# Patient Record
Sex: FEMALE | Race: WHITE | Employment: OTHER | URBAN - METROPOLITAN AREA
[De-identification: names, ages, dates, MRNs, and addresses within clinical notes are randomized per-mention and may not be internally consistent; named-entity substitution may affect disease eponyms.]

---

## 2017-02-07 ENCOUNTER — HOSPITAL ENCOUNTER (EMERGENCY)
Age: 82
Discharge: HOME | End: 2017-02-07
Payer: MEDICARE

## 2017-02-07 VITALS
HEIGHT: 65 IN | OXYGEN SATURATION: 92 % | TEMPERATURE: 98.7 F | SYSTOLIC BLOOD PRESSURE: 121 MMHG | HEART RATE: 77 BPM | WEIGHT: 160 LBS | RESPIRATION RATE: 20 BRPM | DIASTOLIC BLOOD PRESSURE: 55 MMHG | BODY MASS INDEX: 26.66 KG/M2

## 2017-02-07 DIAGNOSIS — G89.29 CHRONIC RIGHT-SIDED LOW BACK PAIN, WITH SCIATICA PRESENCE UNSPECIFIED: Primary | ICD-10-CM

## 2017-02-07 DIAGNOSIS — M54.5 CHRONIC RIGHT-SIDED LOW BACK PAIN, WITH SCIATICA PRESENCE UNSPECIFIED: Primary | ICD-10-CM

## 2017-02-07 PROCEDURE — 99283 EMERGENCY DEPT VISIT LOW MDM: CPT

## 2017-02-07 RX ORDER — TRAMADOL HYDROCHLORIDE 50 MG/1
50 TABLET ORAL
Qty: 15 TAB | Refills: 0 | Status: ON HOLD | OUTPATIENT
Start: 2017-02-07 | End: 2017-02-20 | Stop reason: SDUPTHER

## 2017-02-07 NOTE — ED PROVIDER NOTES
HPI Comments: 80-year-old female complaining of low back pain. Patient states she has had back problems for a long time and recently fell but it did not relate the back pain to that. Patient is a 80 y.o. female presenting with back pain. The history is provided by the patient. Back Pain    This is a chronic problem. The current episode started more than 1 week ago. The problem has not changed since onset. The problem occurs constantly. Patient reports not work related injury. The pain is associated with a fall and a remote injury. The pain is present in the lumbar spine. The pain is at a severity of 8/10. The pain is moderate. Pertinent negatives include no abdominal pain, no abdominal swelling, no bowel incontinence, no perianal numbness, no paresthesias, no paresis, no tingling and no weakness. She has tried nothing for the symptoms. Risk factors include a history of osteoporosis, lack of exercise and menopause. The patient's surgical history non-contributory        Past Medical History:   Diagnosis Date    Arthritis     Headache(784.0)     Hypertension     Ill-defined condition      chronic back pain    Ill-defined condition      neuropathy    Psychiatric disorder      anxiety    Thyroid disease        Past Surgical History:   Procedure Laterality Date    Pr cardiac surg procedure unlist       stent    Hx cholecystectomy       hernia repair    Hx orthopaedic       bilateral knee replacements         History reviewed. No pertinent family history. Social History     Social History    Marital status:      Spouse name: N/A    Number of children: N/A    Years of education: N/A     Occupational History    Not on file.      Social History Main Topics    Smoking status: Never Smoker    Smokeless tobacco: Never Used    Alcohol use No    Drug use: Not on file    Sexual activity: Not on file     Other Topics Concern    Not on file     Social History Narrative         ALLERGIES: Review of patient's allergies indicates no known allergies. Review of Systems   Constitutional: Negative. Negative for activity change. HENT: Negative. Eyes: Negative. Respiratory: Negative. Cardiovascular: Negative. Gastrointestinal: Negative. Negative for abdominal pain and bowel incontinence. Genitourinary: Negative. Musculoskeletal: Positive for back pain. Skin: Negative. Neurological: Negative. Negative for tingling, weakness and paresthesias. Psychiatric/Behavioral: Negative. All other systems reviewed and are negative. Vitals:    02/07/17 0420 02/07/17 0645   BP: 176/75    Pulse: 92 85   Resp: 18    Temp: 98.3 °F (36.8 °C)    SpO2: 98% 95%   Weight: 72.6 kg (160 lb)    Height: 5' 5\" (1.651 m)             Physical Exam   Constitutional: She is oriented to person, place, and time. She appears well-developed and well-nourished. No distress. HENT:   Head: Normocephalic and atraumatic. Right Ear: External ear normal.   Left Ear: External ear normal.   Nose: Nose normal.   Mouth/Throat: Oropharynx is clear and moist. No oropharyngeal exudate. Eyes: Conjunctivae and EOM are normal. Pupils are equal, round, and reactive to light. Right eye exhibits no discharge. Left eye exhibits no discharge. No scleral icterus. Neck: Normal range of motion. Neck supple. No JVD present. No tracheal deviation present. Cardiovascular: Normal rate, regular rhythm and intact distal pulses. Pulmonary/Chest: Effort normal and breath sounds normal. No stridor. No respiratory distress. She has no wheezes. She exhibits no tenderness. Abdominal: Soft. Bowel sounds are normal. She exhibits no distension and no mass. There is no tenderness. Musculoskeletal: Normal range of motion. She exhibits no edema or tenderness. Lumbar back: She exhibits pain. Back:    Neurological: She is alert and oriented to person, place, and time. No cranial nerve deficit. Skin: Skin is warm and dry.  No rash noted. She is not diaphoretic. No erythema. No pallor. Psychiatric: She has a normal mood and affect. Her behavior is normal. Thought content normal.   Nursing note and vitals reviewed. MDM  Number of Diagnoses or Management Options  Diagnosis management comments: Exacerbation of chronic back pain treated with pain medicines on short-term follow-up primary care physician.     ED Course       Procedures

## 2017-02-07 NOTE — ED NOTES
Patient's daughter states needs to call neighbor for ride home. Called number given- [phone]. No one answered. Voicemail left.

## 2017-02-07 NOTE — DISCHARGE INSTRUCTIONS
Learning About Relief for Back Pain  What is back tension and strain? Back strain happens when you overstretch, or pull, a muscle in your back. You may hurt your back in an accident or when you exercise or lift something. Most back pain will get better with rest and time. You can take care of yourself at home to help your back heal.  What can you do first to relieve back pain? When you first feel back pain, try these steps:  · Walk. Take a short walk (10 to 20 minutes) on a level surface (no slopes, hills, or stairs) every 2 to 3 hours. Walk only distances you can manage without pain, especially leg pain. · Relax. Find a comfortable position for rest. Some people are comfortable on the floor or a medium-firm bed with a small pillow under their head and another under their knees. Some people prefer to lie on their side with a pillow between their knees. Don't stay in one position for too long. · Try heat or ice. Try using a heating pad on a low or medium setting, or take a warm shower, for 15 to 20 minutes every 2 to 3 hours. Or you can buy single-use heat wraps that last up to 8 hours. You can also try an ice pack for 10 to 15 minutes every 2 to 3 hours. You can use an ice pack or a bag of frozen vegetables wrapped in a thin towel. There is not strong evidence that either heat or ice will help, but you can try them to see if they help. You may also want to try switching between heat and cold. · Take pain medicine exactly as directed. ¨ If the doctor gave you a prescription medicine for pain, take it as prescribed. ¨ If you are not taking a prescription pain medicine, ask your doctor if you can take an over-the-counter medicine. What else can you do? · Stretch and exercise. Exercises that increase flexibility may relieve your pain and make it easier for your muscles to keep your spine in a good, neutral position. And don't forget to keep walking. · Do self-massage.  You can use self-massage to unwind after work or school or to energize yourself in the morning. You can easily massage your feet, hands, or neck. Self-massage works best if you are in comfortable clothes and are sitting or lying in a comfortable position. Use oil or lotion to massage bare skin. · Reduce stress. Back pain can lead to a vicious [place]: Distress about the pain tenses the muscles in your back, which in turn causes more pain. Learn how to relax your mind and your muscles to lower your stress. Where can you learn more? Go to http://edilia-harjinder.info/. Enter C831 in the search box to learn more about \"Learning About Relief for Back Pain. \"  Current as of: May 23, 2016  Content Version: 11.1  © 5121-1509 [employer]. Care instructions adapted under license by [employer] (which disclaims liability or warranty for this information). If you have questions about a medical condition or this instruction, always ask your healthcare professional. [place] [address] any warranty or liability for your use of this information.

## 2017-02-07 NOTE — ED NOTES
I have reviewed discharge instructions with the patient. The patient verbalized understanding. No questions when given opportunity to ask. Prescription x1 given. Patient discharged via [facility] to waiting room to await ride home.

## 2017-02-07 NOTE — ED TRIAGE NOTES
Pt states that she fell back in January and has been having back pain. Per ems the house is that of [employer] and there is not anywhere to walk around and she falls a lot.

## 2017-02-19 ENCOUNTER — HOSPITAL ENCOUNTER (OUTPATIENT)
Age: 82
Setting detail: OBSERVATION
Discharge: NURSING, REHAB OR LONG-TERM CARE | End: 2017-02-21
Attending: EMERGENCY MEDICINE | Admitting: INTERNAL MEDICINE
Payer: MEDICARE

## 2017-02-19 ENCOUNTER — APPOINTMENT (OUTPATIENT)
Dept: GENERAL RADIOLOGY | Age: 82
End: 2017-02-19
Attending: EMERGENCY MEDICINE
Payer: MEDICARE

## 2017-02-19 ENCOUNTER — APPOINTMENT (OUTPATIENT)
Dept: CT IMAGING | Age: 82
End: 2017-02-19
Attending: INTERNAL MEDICINE
Payer: MEDICARE

## 2017-02-19 DIAGNOSIS — T79.6XXA TRAUMATIC RHABDOMYOLYSIS, INITIAL ENCOUNTER (HCC): ICD-10-CM

## 2017-02-19 DIAGNOSIS — R53.1 GENERALIZED WEAKNESS: Primary | ICD-10-CM

## 2017-02-19 PROBLEM — N30.00 ACUTE CYSTITIS WITHOUT HEMATURIA: Status: ACTIVE | Noted: 2017-02-19

## 2017-02-19 PROBLEM — I87.2 VENOUS STASIS DERMATITIS: Status: ACTIVE | Noted: 2017-02-19

## 2017-02-19 PROBLEM — M62.82 RHABDOMYOLYSIS: Status: ACTIVE | Noted: 2017-02-19

## 2017-02-19 PROBLEM — N18.30 CKD (CHRONIC KIDNEY DISEASE) STAGE 3, GFR 30-59 ML/MIN (HCC): Status: ACTIVE | Noted: 2017-02-19

## 2017-02-19 PROBLEM — G93.41 ACUTE METABOLIC ENCEPHALOPATHY: Status: ACTIVE | Noted: 2017-02-19

## 2017-02-19 LAB
ALBUMIN SERPL BCP-MCNC: 2.7 G/DL (ref 3.2–4.6)
ALBUMIN/GLOB SERPL: 0.5 {RATIO} (ref 1.2–3.5)
ALP SERPL-CCNC: 129 U/L (ref 50–136)
ALT SERPL-CCNC: 28 U/L (ref 12–65)
ANION GAP BLD CALC-SCNC: 9 MMOL/L (ref 7–16)
AST SERPL W P-5'-P-CCNC: 52 U/L (ref 15–37)
BACTERIA URNS QL MICRO: ABNORMAL /HPF
BASOPHILS # BLD AUTO: 0 K/UL (ref 0–0.2)
BASOPHILS # BLD: 0 % (ref 0–2)
BILIRUB SERPL-MCNC: 0.5 MG/DL (ref 0.2–1.1)
BUN SERPL-MCNC: 26 MG/DL (ref 8–23)
CALCIUM SERPL-MCNC: 10 MG/DL (ref 8.3–10.4)
CASTS URNS QL MICRO: ABNORMAL /LPF
CHLORIDE SERPL-SCNC: 106 MMOL/L (ref 98–107)
CK MB CFR SERPL CALC: 0.8 %
CK MB SERPL-MCNC: 5.1 NG/ML (ref 0.5–3.6)
CK SERPL-CCNC: 658 U/L (ref 21–215)
CO2 SERPL-SCNC: 25 MMOL/L (ref 21–32)
CREAT SERPL-MCNC: 1.2 MG/DL (ref 0.6–1)
DIFFERENTIAL METHOD BLD: ABNORMAL
EOSINOPHIL # BLD: 0 K/UL (ref 0–0.8)
EOSINOPHIL NFR BLD: 0 % (ref 0.5–7.8)
EPI CELLS #/AREA URNS HPF: ABNORMAL /HPF
ERYTHROCYTE [DISTWIDTH] IN BLOOD BY AUTOMATED COUNT: 13.1 % (ref 11.9–14.6)
GLOBULIN SER CALC-MCNC: 5.3 G/DL (ref 2.3–3.5)
GLUCOSE SERPL-MCNC: 99 MG/DL (ref 65–100)
HCT VFR BLD AUTO: 38.3 % (ref 35.8–46.3)
HGB BLD-MCNC: 12.4 G/DL (ref 11.7–15.4)
IMM GRANULOCYTES # BLD: 0 K/UL (ref 0–0.5)
IMM GRANULOCYTES NFR BLD AUTO: 0.4 % (ref 0–5)
LYMPHOCYTES # BLD AUTO: 20 % (ref 13–44)
LYMPHOCYTES # BLD: 1.9 K/UL (ref 0.5–4.6)
MCH RBC QN AUTO: 30.9 PG (ref 26.1–32.9)
MCHC RBC AUTO-ENTMCNC: 32.4 G/DL (ref 31.4–35)
MCV RBC AUTO: 95.5 FL (ref 79.6–97.8)
MONOCYTES # BLD: 0.6 K/UL (ref 0.1–1.3)
MONOCYTES NFR BLD AUTO: 7 % (ref 4–12)
MYOGLOBIN SERPL-MCNC: 1296 NG/ML (ref 9–82)
NEUTS SEG # BLD: 6.8 K/UL (ref 1.7–8.2)
NEUTS SEG NFR BLD AUTO: 73 % (ref 43–78)
PLATELET # BLD AUTO: 222 K/UL (ref 150–450)
PMV BLD AUTO: 10.5 FL (ref 10.8–14.1)
POTASSIUM SERPL-SCNC: 4.1 MMOL/L (ref 3.5–5.1)
PROT SERPL-MCNC: 8 G/DL (ref 6.3–8.2)
RBC # BLD AUTO: 4.01 M/UL (ref 4.05–5.25)
RBC #/AREA URNS HPF: ABNORMAL /HPF
SODIUM SERPL-SCNC: 140 MMOL/L (ref 136–145)
WBC # BLD AUTO: 9.4 K/UL (ref 4.3–11.1)
WBC URNS QL MICRO: >100 /HPF

## 2017-02-19 PROCEDURE — 51701 INSERT BLADDER CATHETER: CPT | Performed by: EMERGENCY MEDICINE

## 2017-02-19 PROCEDURE — 96361 HYDRATE IV INFUSION ADD-ON: CPT | Performed by: EMERGENCY MEDICINE

## 2017-02-19 PROCEDURE — 96360 HYDRATION IV INFUSION INIT: CPT | Performed by: EMERGENCY MEDICINE

## 2017-02-19 PROCEDURE — 74011250636 HC RX REV CODE- 250/636: Performed by: EMERGENCY MEDICINE

## 2017-02-19 PROCEDURE — 83874 ASSAY OF MYOGLOBIN: CPT | Performed by: EMERGENCY MEDICINE

## 2017-02-19 PROCEDURE — 74011000302 HC RX REV CODE- 302: Performed by: INTERNAL MEDICINE

## 2017-02-19 PROCEDURE — 81015 MICROSCOPIC EXAM OF URINE: CPT | Performed by: EMERGENCY MEDICINE

## 2017-02-19 PROCEDURE — 74011250637 HC RX REV CODE- 250/637: Performed by: INTERNAL MEDICINE

## 2017-02-19 PROCEDURE — 73080 X-RAY EXAM OF ELBOW: CPT

## 2017-02-19 PROCEDURE — 99218 HC RM OBSERVATION: CPT

## 2017-02-19 PROCEDURE — 96361 HYDRATE IV INFUSION ADD-ON: CPT

## 2017-02-19 PROCEDURE — 87040 BLOOD CULTURE FOR BACTERIA: CPT | Performed by: INTERNAL MEDICINE

## 2017-02-19 PROCEDURE — 82550 ASSAY OF CK (CPK): CPT | Performed by: EMERGENCY MEDICINE

## 2017-02-19 PROCEDURE — 87186 SC STD MICRODIL/AGAR DIL: CPT | Performed by: INTERNAL MEDICINE

## 2017-02-19 PROCEDURE — 74011250636 HC RX REV CODE- 250/636: Performed by: INTERNAL MEDICINE

## 2017-02-19 PROCEDURE — 77030011256 HC DRSG MEPILEX <16IN NO BORD MOLN -A

## 2017-02-19 PROCEDURE — 87086 URINE CULTURE/COLONY COUNT: CPT | Performed by: INTERNAL MEDICINE

## 2017-02-19 PROCEDURE — 70450 CT HEAD/BRAIN W/O DYE: CPT

## 2017-02-19 PROCEDURE — 77030011943

## 2017-02-19 PROCEDURE — 87088 URINE BACTERIA CULTURE: CPT | Performed by: INTERNAL MEDICINE

## 2017-02-19 PROCEDURE — 96372 THER/PROPH/DIAG INJ SC/IM: CPT

## 2017-02-19 PROCEDURE — 80053 COMPREHEN METABOLIC PANEL: CPT | Performed by: EMERGENCY MEDICINE

## 2017-02-19 PROCEDURE — 86580 TB INTRADERMAL TEST: CPT | Performed by: INTERNAL MEDICINE

## 2017-02-19 PROCEDURE — 36415 COLL VENOUS BLD VENIPUNCTURE: CPT | Performed by: INTERNAL MEDICINE

## 2017-02-19 PROCEDURE — 85025 COMPLETE CBC W/AUTO DIFF WBC: CPT | Performed by: EMERGENCY MEDICINE

## 2017-02-19 PROCEDURE — 99284 EMERGENCY DEPT VISIT MOD MDM: CPT | Performed by: EMERGENCY MEDICINE

## 2017-02-19 RX ORDER — ENOXAPARIN SODIUM 100 MG/ML
40 INJECTION SUBCUTANEOUS EVERY 24 HOURS
Status: DISCONTINUED | OUTPATIENT
Start: 2017-02-19 | End: 2017-02-21 | Stop reason: HOSPADM

## 2017-02-19 RX ORDER — SODIUM CHLORIDE 9 MG/ML
100 INJECTION, SOLUTION INTRAVENOUS CONTINUOUS
Status: DISCONTINUED | OUTPATIENT
Start: 2017-02-19 | End: 2017-02-21 | Stop reason: HOSPADM

## 2017-02-19 RX ORDER — SODIUM CHLORIDE 0.9 % (FLUSH) 0.9 %
5-10 SYRINGE (ML) INJECTION EVERY 8 HOURS
Status: DISCONTINUED | OUTPATIENT
Start: 2017-02-19 | End: 2017-02-21 | Stop reason: HOSPADM

## 2017-02-19 RX ORDER — TRAMADOL HYDROCHLORIDE 50 MG/1
50 TABLET ORAL
Status: DISCONTINUED | OUTPATIENT
Start: 2017-02-19 | End: 2017-02-21 | Stop reason: HOSPADM

## 2017-02-19 RX ORDER — LEVOTHYROXINE SODIUM 75 UG/1
75 TABLET ORAL
Status: DISCONTINUED | OUTPATIENT
Start: 2017-02-20 | End: 2017-02-21 | Stop reason: HOSPADM

## 2017-02-19 RX ORDER — SODIUM CHLORIDE 0.9 % (FLUSH) 0.9 %
5-10 SYRINGE (ML) INJECTION AS NEEDED
Status: DISCONTINUED | OUTPATIENT
Start: 2017-02-19 | End: 2017-02-21 | Stop reason: HOSPADM

## 2017-02-19 RX ORDER — GABAPENTIN 300 MG/1
600 CAPSULE ORAL 3 TIMES DAILY
Status: DISCONTINUED | OUTPATIENT
Start: 2017-02-19 | End: 2017-02-21 | Stop reason: HOSPADM

## 2017-02-19 RX ADMIN — ENOXAPARIN SODIUM 40 MG: 40 INJECTION SUBCUTANEOUS at 17:04

## 2017-02-19 RX ADMIN — SODIUM CHLORIDE 100 ML/HR: 900 INJECTION, SOLUTION INTRAVENOUS at 20:53

## 2017-02-19 RX ADMIN — Medication 5 ML: at 20:53

## 2017-02-19 RX ADMIN — GABAPENTIN 600 MG: 300 CAPSULE ORAL at 20:49

## 2017-02-19 RX ADMIN — SODIUM CHLORIDE 150 ML/HR: 900 INJECTION, SOLUTION INTRAVENOUS at 12:35

## 2017-02-19 RX ADMIN — GABAPENTIN 600 MG: 300 CAPSULE ORAL at 17:04

## 2017-02-19 RX ADMIN — TUBERCULIN PURIFIED PROTEIN DERIVATIVE 5 UNITS: 5 INJECTION, SOLUTION INTRADERMAL at 17:04

## 2017-02-19 RX ADMIN — Medication 10 ML: at 17:06

## 2017-02-19 NOTE — ED PROVIDER NOTES
HPI Comments: Patient arrives to the emergency room in via EMS from home after having been found lying on the floor in her living room this morning. Patient states that she was in her recliner and negative attempting to get up to go to bed at about midnight when her feet slid out from under her she could not stop from falling and landed facedown on the floor. She complains of pain to the elbow into the left side of the face where she sustained an abrasion she denies any loss of consciousness or head injury she does suffer from chronic back pain for which she takes Ultram and Neurontin and states this does not feel at particular worse at this time however due to generalized weakness she was unable to get herself up off the floor and remained there until she was found by her special needs daughter this morning. She denies any recent illnesses and other than the pain in the right elbow in the face has no other complaints    Patient is a 80 y.o. female presenting with fall. The history is provided by the patient. Fall   The accident occurred 6 to 12 hours ago. The fall occurred while standing. She fell from a height of ground level. She landed on hard floor. There was no blood loss. Point of impact: hands and elbows. The pain is present in the right elbow (Left cheek). The pain is at a severity of 8/10. The pain is severe. She was not ambulatory at the scene. There was no entrapment after the fall. There was no drug use involved in the accident. There was no alcohol use involved in the accident. Pertinent negatives include no visual change, no fever, no numbness, no bowel incontinence, no nausea, no vomiting, no hematuria, no headaches, no extremity weakness, no hearing loss, no loss of consciousness, no tingling and no laceration. The risk factors include being elderly. The symptoms are aggravated by activity. She has tried nothing for the symptoms. The treatment provided no relief.         Past Medical History: Diagnosis Date    Arthritis     Headache(784.0)     Hypertension     Ill-defined condition      chronic back pain    Ill-defined condition      neuropathy    Psychiatric disorder      anxiety    Thyroid disease        Past Surgical History:   Procedure Laterality Date    Pr cardiac surg procedure unlist       stent    Hx cholecystectomy       hernia repair    Hx orthopaedic       bilateral knee replacements         History reviewed. No pertinent family history. Social History     Social History    Marital status:      Spouse name: N/A    Number of children: N/A    Years of education: N/A     Occupational History    Not on file. Social History Main Topics    Smoking status: Never Smoker    Smokeless tobacco: Never Used    Alcohol use No    Drug use: Not on file    Sexual activity: Not on file     Other Topics Concern    Not on file     Social History Narrative         ALLERGIES: Review of patient's allergies indicates no known allergies. Review of Systems   Constitutional: Negative for chills and fever. Gastrointestinal: Negative for bowel incontinence, nausea and vomiting. Genitourinary: Negative for hematuria. Musculoskeletal: Negative for extremity weakness. Neurological: Negative for tingling, loss of consciousness, numbness and headaches. All other systems reviewed and are negative. Vitals:    02/19/17 1038 02/19/17 1043 02/19/17 1100 02/19/17 1101   BP: 157/74 157/74 164/74    Pulse: 96 94  93   Resp: 16      Temp: 97.9 °F (36.6 °C)      SpO2: 96% 96%  95%   Weight: 72.6 kg (160 lb)      Height: 5' 5\" (1.651 m)               Physical Exam   Constitutional: She is oriented to person, place, and time. She appears well-developed and well-nourished. No distress. Patient is disheveled and incontinent of urine   HENT:   Head: Normocephalic. Abrasion to the left cheek   Eyes: Conjunctivae and EOM are normal. Pupils are equal, round, and reactive to light. Neck: Normal range of motion. Neck supple. Cardiovascular: Normal rate, regular rhythm, normal heart sounds and intact distal pulses. Pulmonary/Chest: Effort normal and breath sounds normal.   Abdominal: Soft. Bowel sounds are normal.   Musculoskeletal: Normal range of motion. She exhibits tenderness. She exhibits no edema or deformity. Tenderness noted to the left elbow through range of motion but no deformities present or swelling is noted   Neurological: She is alert and oriented to person, place, and time. She has normal reflexes. She displays normal reflexes. No cranial nerve deficit. She exhibits normal muscle tone. Coordination normal.   Patient exhibits some weakness of the lower extremities but symmetric   Skin: Skin is warm and dry. No laceration noted. Psychiatric: She has a normal mood and affect. Her behavior is normal.   Nursing note and vitals reviewed.        MDM  Number of Diagnoses or Management Options     Amount and/or Complexity of Data Reviewed  Clinical lab tests: ordered and reviewed  Tests in the radiology section of CPT®: ordered and reviewed  Independent visualization of images, tracings, or specimens: yes    Risk of Complications, Morbidity, and/or Mortality  Presenting problems: high  Diagnostic procedures: high  Management options: moderate    Patient Progress  Patient progress: stable    ED Course       Procedures
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
## 2017-02-19 NOTE — IP AVS SNAPSHOT
[address] 
[phone] Patient: Garret Rodriguez MRN: [id number] DDN:[id number] You are allergic to the following No active allergies Immunizations Administered for This Admission Name Date  
 TB Skin Test (PPD) Intradermal 2/19/2017 Recent Documentation Height Weight Breastfeeding? BMI OB Status Smoking Status 1.651 m 72.6 kg No 26.63 kg/m2 Postmenopausal Never Smoker Unresulted Labs Order Current Status CULTURE, BLOOD Preliminary result PLEASE READ & DOCUMENT PPD TEST IN 24 HRS Preliminary result Emergency Contacts Name Discharge Info Relation Home Work Mobile Orlando Ayala  Other Relative [6] [phone] About your hospitalization You were admitted on:  February 19, 2017 [facility] You were discharged on:  February 21, 2017 Unit phone number:  [phone] Why you were hospitalized Your primary diagnosis was:  Acute Metabolic Encephalopathy Your diagnoses also included:  Acute Cystitis Without Hematuria, Rhabdomyolysis, Ckd (Chronic Kidney Disease) Stage 3, Gfr 30-59 Ml/Min, Venous Stasis Dermatitis, Cellulitis Of Left Lower Extremity Providers Seen During Your Hospitalizations Provider Role Specialty Primary office phone Kaylee Rae DO Attending Provider Emergency Medicine [phone] Ozzy Velazquez MD Attending Provider Internal Medicine [phone] Your Primary Care Physician (PCP) Primary Care Physician Office Phone Office Fax Katherine Tabor, [address] \[facility]\"" [phone] Follow-up Information Follow up With Details Comments Contact Info Sandeep Delcid MD Schedule an appointment as soon as possible for a visit after discharge from [place] [address] [address] [place] [address] 
[phone] Your Appointments Wednesday March 08, 2017  3:15 PM EST Office Visit with Sandra Santos MD  
[facility]) [address] [id number] [phone] Current Discharge Medication List  
  
START taking these medications Dose & Instructions Dispensing Information Comments Morning Noon Evening Bedtime  
 cephALEXin 500 mg capsule Commonly known as:  Gearold Spry Your next dose is: Today, Tomorrow Other:  _________ Dose:  500 mg Take 1 Cap by mouth four (4) times daily for 5 days. Quantity:  20 Cap Refills:  0 CONTINUE these medications which have NOT CHANGED Dose & Instructions Dispensing Information Comments Morning Noon Evening Bedtime  
 acetaminophen 325 mg tablet Commonly known as:  TYLENOL Your next dose is: Today, Tomorrow Other:  _________ Dose:  650 mg Take 2 Tabs by mouth every six (6) hours as needed. Quantity:  30 Tab Refills:  0  
     
   
   
   
  
 gabapentin 600 mg tablet Commonly known as:  NEURONTIN Your next dose is: Today, Tomorrow Other:  _________ Dose:  600 mg Take 1 Tab by mouth three (3) times daily. Quantity:  90 Tab Refills:  5  
     
   
   
   
  
 levothyroxine 75 mcg tablet Commonly known as:  SYNTHROID Your next dose is: Today, Tomorrow Other:  _________ Dose:  75 mcg Take 1 Tab by mouth Daily (before breakfast). Quantity:  30 Tab Refills:  5  
     
   
   
   
  
 traMADol 50 mg tablet Commonly known as:  ULTRAM  
   
Your next dose is: Today, Tomorrow Other:  _________ Dose:  50 mg Take 1 Tab by mouth every six (6) hours as needed for Pain. Max Daily Amount: 200 mg. Indications: Pain Quantity:  20 Tab Refills:  0 Where to Get Your Medications Information on where to get these meds will be given to you by the nurse or doctor. ! Ask your nurse or doctor about these medications  
  cephALEXin 500 mg capsule  
 traMADol 50 mg tablet Discharge Instructions None Discharge Orders None [employer] Announcement We are excited to announce that we are making your provider's discharge notes available to you in Girlt[employer]. You will see these notes when they are completed and signed by the physician that discharged you from your recent hospital stay. If you have any questions or concerns about any information you see in Girltank, please call the Health Information Department where you were seen or reach out to your Primary Care Provider for more information about your plan of care. Introducing [facility]! Marin Nugent introduces [employer] patient portal. Now you can access parts of your medical record, email your doctor's office, and request medication refills online. 1. In your internet browser, go to [email or web address] 2. Click on the First Time User? Click Here link in the Sign In box. You will see the New Member Sign Up page. 3. Enter your [employer] Access Code exactly as it appears below. You will not need to use this code after youve completed the sign-up process. If you do not sign up before the expiration date, you must request a new code. · Girl[employer] Access Code: [email or web address] Expires: 5/20/2017 11:43 AM 
 
4. Enter the last four digits of your Social Security Number (xxxx) and Date of Birth (mm/dd/yyyy) as indicated and click Submit. You will be taken to the next sign-up page. 5. Create a I AM ATt ID. This will be your Girltank login ID and cannot be changed, so think of one that is secure and easy to remember. 6. Create a [employer] password. You can change your password at any time. 7. Enter your Password Reset Question and Answer.  This can be used at a later time if you forget your password. 8. Enter your e-mail address. You will receive e-mail notification when new information is available in [address] 9. Click Sign Up. You can now view and download portions of your medical record. 10. Click the Download Summary menu link to download a portable copy of your medical information. If you have questions, please visit the Frequently Asked Questions section of the [employer] website. Remember, [employer] is NOT to be used for urgent needs. For medical emergencies, dial 911. Now available from your iPhone and Android! General Information Please provide this summary of care documentation to your next provider. Patient Signature:  ____________________________________________________________ Date:  ____________________________________________________________  
  
Alicja Padilla Provider Signature:  ____________________________________________________________ Date:  ____________________________________________________________
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
## 2017-02-19 NOTE — PROGRESS NOTES
TRANSFER - IN REPORT:    Verbal report received from Davida Castleman, RN on [email or web address] [facility]  being received from ED for routine progression of care      Report consisted of patients Situation, Background, Assessment and   Recommendations(SBAR). Information from the following report(s) SBAR was reviewed with the receiving nurse. Opportunity for questions and clarification was provided. Assessment completed upon patients arrival to unit and care assumed.
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
## 2017-02-19 NOTE — ED NOTES
TRANSFER - OUT REPORT:    Verbal report given to Logan Graves RN(name) on Marta Vieira  being transferred to  618(unit) for routine progression of care       Report consisted of patients Situation, Background, Assessment and   Recommendations(SBAR). Information from the following report(s) ED Summary was reviewed with the receiving nurse. Lines:   Peripheral IV 02/19/17 Left Forearm (Active)        Opportunity for questions and clarification was provided.       Patient transported with:   Patient-specific medications from Pharmacy

## 2017-02-19 NOTE — H&P
HOSPITALIST H&P/CONSULT  NAME:  Zain Vieira   Age:  80 y.o.  :   1930   MRN:   [id number]  PCP: Colletta Holms, MD  Consulting MD:  Treatment Team: Attending Provider: Keysha Mcdonough MD; Primary Nurse: Don Ellis  HPI:   Patient  arrives to the emergency room in via EMS from home after having been found lying on the floor in her living room this morning. Patient states that she was in her recliner and negative attempting to get up to go to bed at about midnight when her feet slid out from under her she could not stop from falling and landed facedown on the floor. She complains of pain to the elbow into the left side of the face where she sustained an abrasion she denies any loss of consciousness or head injury she does suffer from chronic back pain for which she takes Ultram and Neurontin and states this does not feel at particular worse at this time however due to generalized weakness she was unable to get herself up off the floor and remained there until she was found by her special needs daughter this morning. She denies any recent illnesses and other than the pain in the right elbow in the face has no other complaints     Patient is a 80 y.o. female presenting with fall. The history is provided by the patient. Fall   The accident occurred 6 to 12 hours ago. The fall occurred while standing. She fell from a height of ground level. She landed on hard floor. There was no blood loss. Point of impact: hands and elbows. The pain is present in the right elbow (Left cheek). The pain is at a severity of 8/10. The pain is severe. She was not ambulatory at the scene. There was no entrapment after the fall. There was no drug use involved in the accident. There was no alcohol use involved in the accident.  Pertinent negatives include no visual change, no fever, no numbness, no bowel incontinence, no nausea, no vomiting, no hematuria, no headaches, no extremity weakness, no hearing loss, no loss of consciousness, no tingling and no laceration. The risk factors include being elderly. The symptoms are aggravated by activity. She has tried nothing for the symptoms. The treatment provided no relief  Pt confused poor historian. Complete ROS done and is as stated in HPI or otherwise negative  Past Medical History   Diagnosis Date    Arthritis     Headache(784.0)     Hypertension     Ill-defined condition      chronic back pain    Ill-defined condition      neuropathy    Psychiatric disorder      anxiety    Thyroid disease       Past Surgical History   Procedure Laterality Date    Pr cardiac surg procedure unlist       stent    Hx cholecystectomy       hernia repair    Hx orthopaedic       bilateral knee replacements      Prior to Admission Medications   Prescriptions Last Dose Informant Patient Reported? Taking?   acetaminophen (TYLENOL) 325 mg tablet   No No   Sig: Take 2 Tabs by mouth every six (6) hours as needed. gabapentin (NEURONTIN) 600 mg tablet   No No   Sig: Take 1 Tab by mouth three (3) times daily. levothyroxine (SYNTHROID) 75 mcg tablet   No No   Sig: Take 1 Tab by mouth Daily (before breakfast). traMADol (ULTRAM) 50 mg tablet   No No   Sig: Take 1 Tab by mouth every six (6) hours as needed for Pain. Max Daily Amount: 200 mg. Indications: Pain      Facility-Administered Medications: None     No Known Allergies   Social History   Substance Use Topics    Smoking status: Never Smoker    Smokeless tobacco: Never Used    Alcohol use No      History reviewed. No pertinent family history.    Objective:     Visit Vitals    /80    Pulse 89    Temp 97.4 °F (36.3 °C)    Resp 16    Ht 5' 5\" (1.651 m)    Wt 72.6 kg (160 lb)    SpO2 97%    BMI 26.63 kg/m2      Temp (24hrs), Av °F (36.7 °C), Min:97.4 °F (36.3 °C), Max:98.8 °F (37.1 °C)    Oxygen Therapy  O2 Sat (%): 97 % (17 1447)  Pulse via Oximetry: 78 beats per minute (17 1403)  O2 Device: Room air (17 1038)  Physical Exam:  General:    Alert, cooperative, no distress, appears stated age. Confused white female    Head:   Normocephalic, without obvious abnormality, atraumatic. Nose:  Nares normal. No drainage or sinus tenderness. Lungs:   Clear to auscultation bilaterally. No Wheezing or Rhonchi. No rales. Heart:   Regular rate and rhythm,  no murmur, rub or gallop. Abdomen:   Soft, non-tender. Not distended. Bowel sounds normal.   Extremities: No cyanosis. No edema. No clubbing  Skin:     Texture, turgor normal. No rashes or lesions. Not Jaundiced LLE erythematous  Neurologic: Alert and oriented x 3, no focal deficits   Data Review:   Recent Results (from the past 24 hour(s))   CBC WITH AUTOMATED DIFF    Collection Time: 02/19/17 10:41 AM   Result Value Ref Range    WBC 9.4 4.3 - 11.1 K/uL    RBC 4.01 (L) 4.05 - 5.25 M/uL    HGB 12.4 11.7 - 15.4 g/dL    HCT 38.3 35.8 - 46.3 %    MCV 95.5 79.6 - 97.8 FL    MCH 30.9 26.1 - 32.9 PG    MCHC 32.4 31.4 - 35.0 g/dL    RDW 13.1 11.9 - 14.6 %    PLATELET 836 523 - 435 K/uL    MPV 10.5 (L) 10.8 - 14.1 FL    DF AUTOMATED      NEUTROPHILS 73 43 - 78 %    LYMPHOCYTES 20 13 - 44 %    MONOCYTES 7 4.0 - 12.0 %    EOSINOPHILS 0 (L) 0.5 - 7.8 %    BASOPHILS 0 0.0 - 2.0 %    IMMATURE GRANULOCYTES 0.4 0.0 - 5.0 %    ABS. NEUTROPHILS 6.8 1.7 - 8.2 K/UL    ABS. LYMPHOCYTES 1.9 0.5 - 4.6 K/UL    ABS. MONOCYTES 0.6 0.1 - 1.3 K/UL    ABS. EOSINOPHILS 0.0 0.0 - 0.8 K/UL    ABS. BASOPHILS 0.0 0.0 - 0.2 K/UL    ABS. IMM.  GRANS. 0.0 0.0 - 0.5 K/UL   METABOLIC PANEL, COMPREHENSIVE    Collection Time: 02/19/17 10:41 AM   Result Value Ref Range    Sodium 140 136 - 145 mmol/L    Potassium 4.1 3.5 - 5.1 mmol/L    Chloride 106 98 - 107 mmol/L    CO2 25 21 - 32 mmol/L    Anion gap 9 7 - 16 mmol/L    Glucose 99 65 - 100 mg/dL    BUN 26 (H) 8 - 23 MG/DL    Creatinine 1.20 (H) 0.6 - 1.0 MG/DL    GFR est AA 55 (L) >60 ml/min/1.73m2    GFR est non-AA 45 (L) >60 ml/min/1.73m2    Calcium 10.0 8.3 - 10.4 MG/DL    Bilirubin, total 0.5 0.2 - 1.1 MG/DL    ALT (SGPT) 28 12 - 65 U/L    AST (SGOT) 52 (H) 15 - 37 U/L    Alk. phosphatase 129 50 - 136 U/L    Protein, total 8.0 6.3 - 8.2 g/dL    Albumin 2.7 (L) 3.2 - 4.6 g/dL    Globulin 5.3 (H) 2.3 - 3.5 g/dL    A-G Ratio 0.5 (L) 1.2 - 3.5     CK WITH MB    Collection Time: 02/19/17 10:41 AM   Result Value Ref Range     (H) 21 - 215 U/L    CK - MB 5.1 (H) 0.5 - 3.6 ng/ml    CK-MB Index 0.8 <2.5 %   URINE MICROSCOPIC    Collection Time: 02/19/17 12:15 PM   Result Value Ref Range    WBC >100 (H) 0 /hpf    RBC 0-3 0 /hpf    Epithelial cells 0-3 0 /hpf    Bacteria 4+ (H) 0 /hpf    Casts 3-5 0 /lpf   MYOGLOBIN    Collection Time: 02/19/17 12:28 PM   Result Value Ref Range    Myoglobin 1296 (H) 9 - 82 ng/mL     Imaging /Procedures /Studies     Assessment and Plan:      Active Hospital Problems    Diagnosis Date Noted    Acute cystitis without hematuria 02/19/2017    Acute metabolic encephalopathy 58/60/0926    Rhabdomyolysis 02/19/2017    CKD (chronic kidney disease) stage 3, GFR 30-59 ml/min 02/19/2017    Venous stasis dermatitis 02/19/2017       PLAN  ·  CT head   · Urine culture   · Blood C&S x2  · IV rocephin  · IVF NS at 100ml/h  · CPK in am   · Serial troponin I  · EKG  · ECHO  · CBC,CMP in am    Code Status:     Anticipated discharge:     Signed By: Bianca Johnson MD     February 19, 2017

## 2017-02-19 NOTE — Clinical Note
Patient Class[de-identified] Observation [073] Type of Bed: Medical [8] Reason for Observation: uti Admitting Physician: Eduardo Gibson [[id number]] Attending Physician: Eduardo Gibson [[id number]] Diagnosis: uti

## 2017-02-19 NOTE — IP AVS SNAPSHOT
Current Discharge Medication List  
  
Take these medications at their scheduled times Dose & Instructions Dispensing Information Comments Morning Noon Evening Bedtime  
 cephALEXin 500 mg capsule Commonly known as:  Reyes Olivarez Your next dose is: Today, Tomorrow Other:  ____________ Dose:  500 mg Take 1 Cap by mouth four (4) times daily for 5 days. Quantity:  20 Cap Refills:  0  
     
   
   
   
  
 gabapentin 600 mg tablet Commonly known as:  NEURONTIN Your next dose is: Today, Tomorrow Other:  ____________ Dose:  600 mg Take 1 Tab by mouth three (3) times daily. Quantity:  90 Tab Refills:  5  
     
   
   
   
  
 levothyroxine 75 mcg tablet Commonly known as:  SYNTHROID Your next dose is: Today, Tomorrow Other:  ____________ Dose:  75 mcg Take 1 Tab by mouth Daily (before breakfast). Quantity:  30 Tab Refills:  5 Take these medications as needed Dose & Instructions Dispensing Information Comments Morning Noon Evening Bedtime  
 acetaminophen 325 mg tablet Commonly known as:  TYLENOL Your next dose is: Today, Tomorrow Other:  ____________ Dose:  650 mg Take 2 Tabs by mouth every six (6) hours as needed. Quantity:  30 Tab Refills:  0  
     
   
   
   
  
 traMADol 50 mg tablet Commonly known as:  ULTRAM  
   
Your next dose is: Today, Tomorrow Other:  ____________ Dose:  50 mg Take 1 Tab by mouth every six (6) hours as needed for Pain. Max Daily Amount: 200 mg. Indications: Pain Quantity:  20 Tab Refills:  0 Where to Get Your Medications Information about where to get these medications is not yet available ! Ask your nurse or doctor about these medications  
  cephALEXin 500 mg capsule  
 traMADol 50 mg tablet

## 2017-02-20 PROBLEM — L03.116 CELLULITIS OF LEFT LOWER EXTREMITY: Status: ACTIVE | Noted: 2017-02-20

## 2017-02-20 LAB
ALBUMIN SERPL BCP-MCNC: 2.1 G/DL (ref 3.2–4.6)
ALBUMIN/GLOB SERPL: 0.5 {RATIO} (ref 1.2–3.5)
ALP SERPL-CCNC: 105 U/L (ref 50–136)
ALT SERPL-CCNC: 23 U/L (ref 12–65)
ANION GAP BLD CALC-SCNC: 6 MMOL/L (ref 7–16)
AST SERPL W P-5'-P-CCNC: 40 U/L (ref 15–37)
BILIRUB SERPL-MCNC: 0.3 MG/DL (ref 0.2–1.1)
BUN SERPL-MCNC: 21 MG/DL (ref 8–23)
CALCIUM SERPL-MCNC: 9.3 MG/DL (ref 8.3–10.4)
CHLORIDE SERPL-SCNC: 115 MMOL/L (ref 98–107)
CK SERPL-CCNC: 352 U/L (ref 21–215)
CO2 SERPL-SCNC: 27 MMOL/L (ref 21–32)
CREAT SERPL-MCNC: 1.1 MG/DL (ref 0.6–1)
ERYTHROCYTE [DISTWIDTH] IN BLOOD BY AUTOMATED COUNT: 13.5 % (ref 11.9–14.6)
GLOBULIN SER CALC-MCNC: 4.4 G/DL (ref 2.3–3.5)
GLUCOSE SERPL-MCNC: 82 MG/DL (ref 65–100)
HCT VFR BLD AUTO: 37.2 % (ref 35.8–46.3)
HGB BLD-MCNC: 11.4 G/DL (ref 11.7–15.4)
MCH RBC QN AUTO: 30.3 PG (ref 26.1–32.9)
MCHC RBC AUTO-ENTMCNC: 30.6 G/DL (ref 31.4–35)
MCV RBC AUTO: 98.9 FL (ref 79.6–97.8)
MM INDURATION POC: NORMAL 0MM (ref 0–5)
PLATELET # BLD AUTO: 206 K/UL (ref 150–450)
PMV BLD AUTO: 10.5 FL (ref 10.8–14.1)
POTASSIUM SERPL-SCNC: 4.2 MMOL/L (ref 3.5–5.1)
PPD POC: NORMAL NEGATIVE
PROT SERPL-MCNC: 6.5 G/DL (ref 6.3–8.2)
RBC # BLD AUTO: 3.76 M/UL (ref 4.05–5.25)
SODIUM SERPL-SCNC: 148 MMOL/L (ref 136–145)
WBC # BLD AUTO: 6.1 K/UL (ref 4.3–11.1)

## 2017-02-20 PROCEDURE — 77030018836 HC SOL IRR NACL ICUM -A

## 2017-02-20 PROCEDURE — 97162 PT EVAL MOD COMPLEX 30 MIN: CPT

## 2017-02-20 PROCEDURE — 80053 COMPREHEN METABOLIC PANEL: CPT | Performed by: INTERNAL MEDICINE

## 2017-02-20 PROCEDURE — 74011000258 HC RX REV CODE- 258: Performed by: INTERNAL MEDICINE

## 2017-02-20 PROCEDURE — 96365 THER/PROPH/DIAG IV INF INIT: CPT

## 2017-02-20 PROCEDURE — 93306 TTE W/DOPPLER COMPLETE: CPT

## 2017-02-20 PROCEDURE — 99218 HC RM OBSERVATION: CPT

## 2017-02-20 PROCEDURE — G8988 SELF CARE GOAL STATUS: HCPCS

## 2017-02-20 PROCEDURE — 74011250636 HC RX REV CODE- 250/636: Performed by: INTERNAL MEDICINE

## 2017-02-20 PROCEDURE — 36415 COLL VENOUS BLD VENIPUNCTURE: CPT | Performed by: INTERNAL MEDICINE

## 2017-02-20 PROCEDURE — 97532 HC OT COGNITIVE SKILL DEV 15 MIN: CPT

## 2017-02-20 PROCEDURE — G8987 SELF CARE CURRENT STATUS: HCPCS

## 2017-02-20 PROCEDURE — 85027 COMPLETE CBC AUTOMATED: CPT | Performed by: INTERNAL MEDICINE

## 2017-02-20 PROCEDURE — 74011250637 HC RX REV CODE- 250/637: Performed by: INTERNAL MEDICINE

## 2017-02-20 PROCEDURE — 97530 THERAPEUTIC ACTIVITIES: CPT

## 2017-02-20 PROCEDURE — 96361 HYDRATE IV INFUSION ADD-ON: CPT

## 2017-02-20 PROCEDURE — 96372 THER/PROPH/DIAG INJ SC/IM: CPT

## 2017-02-20 PROCEDURE — G8979 MOBILITY GOAL STATUS: HCPCS

## 2017-02-20 PROCEDURE — 82550 ASSAY OF CK (CPK): CPT | Performed by: INTERNAL MEDICINE

## 2017-02-20 PROCEDURE — 97166 OT EVAL MOD COMPLEX 45 MIN: CPT

## 2017-02-20 PROCEDURE — G8978 MOBILITY CURRENT STATUS: HCPCS

## 2017-02-20 RX ORDER — NYSTATIN 100000 [USP'U]/G
POWDER TOPICAL 2 TIMES DAILY
Status: DISCONTINUED | OUTPATIENT
Start: 2017-02-20 | End: 2017-02-21 | Stop reason: HOSPADM

## 2017-02-20 RX ADMIN — GABAPENTIN 600 MG: 300 CAPSULE ORAL at 08:37

## 2017-02-20 RX ADMIN — TRAMADOL HYDROCHLORIDE 50 MG: 50 TABLET, FILM COATED ORAL at 20:44

## 2017-02-20 RX ADMIN — NYSTATIN: 100000 POWDER TOPICAL at 20:44

## 2017-02-20 RX ADMIN — CEFTRIAXONE 1 G: 1 INJECTION, POWDER, FOR SOLUTION INTRAMUSCULAR; INTRAVENOUS at 16:45

## 2017-02-20 RX ADMIN — GABAPENTIN 600 MG: 300 CAPSULE ORAL at 20:44

## 2017-02-20 RX ADMIN — Medication 10 ML: at 06:19

## 2017-02-20 RX ADMIN — Medication 5 ML: at 22:00

## 2017-02-20 RX ADMIN — ENOXAPARIN SODIUM 40 MG: 40 INJECTION SUBCUTANEOUS at 16:41

## 2017-02-20 RX ADMIN — GABAPENTIN 600 MG: 300 CAPSULE ORAL at 16:41

## 2017-02-20 RX ADMIN — LEVOTHYROXINE SODIUM 75 MCG: 75 TABLET ORAL at 08:37

## 2017-02-20 RX ADMIN — SODIUM CHLORIDE 100 ML/HR: 900 INJECTION, SOLUTION INTRAVENOUS at 16:39

## 2017-02-20 NOTE — PROGRESS NOTES
Pt c/o pain L side undreneath L breast bruise noted. States \" It is from the fall at home. \" C/O pain L leg calf with 2 + non pitting swelling slight redness and warmth. Will notify Doctor.

## 2017-02-20 NOTE — PROGRESS NOTES
Hospitalist Progress Note    2017  Admit Date: 2017 10:40 AM   NAME: Maryam Vieira   :  1930   MRN:  [id number]   Attending: Rupinder Overton MD  PCP:  Kalia Cardoso MD    SUBJECTIVE:   Patient more alert this am no fever or chills      Review of Systems negative with exception of pertinent positives noted above  PHYSICAL EXAM     Visit Vitals    /55    Pulse 76    Temp 99.5 °F (37.5 °C)    Resp 17    Ht 5' 5\" (1.651 m)    Wt 72.6 kg (160 lb)    SpO2 96%    Breastfeeding No    BMI 26.63 kg/m2      Temp (24hrs), Av.7 °F (37.1 °C), Min:98 °F (36.7 °C), Max:99.8 °F (37.7 °C)    Oxygen Therapy  O2 Sat (%): 96 % (17 1452)  Pulse via Oximetry: 78 beats per minute (17 1403)  O2 Device: Room air (17 1038)    Intake/Output Summary (Last 24 hours) at 17 1606  Last data filed at 17 1453   Gross per 24 hour   Intake             2614 ml   Output                0 ml   Net             2614 ml      General: No acute distress    Lungs:  CTA Bilaterally. Heart:  Regular rate and rhythm,  No murmur, rub, or gallop  Abdomen: Soft, Non distended, Non tender, Positive bowel sounds  Extremities: No cyanosis, clubbing or edema  Neurologic:  No focal deficits    ASSESSMENT      Active Hospital Problems    Diagnosis Date Noted    Cellulitis of left lower extremity 2017    Acute cystitis without hematuria 2017    Acute metabolic encephalopathy     Rhabdomyolysis 2017    CKD (chronic kidney disease) stage 3, GFR 30-59 ml/min 2017    Venous stasis dermatitis 2017     Plan:   Acute cystitis without hematuria  --- urine culture > 100k GNR. Urine culture from 2016 grew E.coli with pansensitivity  --- rocephin 1 gram iv daily  Will await ID&sensitivty before changing to oral antibx.    2017    Acute metabolic encephalopathy resolved   2017    Rhabdomyolysis  Improved to 350+  CPK in am, BMP in am   2017    CKD (chronic kidney disease) stage 3, GFR 30-59 ml/min   02/19/2017    Venous stasis dermatitis 02/19/2017      · Continue rocephin    DVT Prophylaxis:     Signed By: Bianca Johnson MD     February 20, 2017

## 2017-02-20 NOTE — PROGRESS NOTES
Problem: Mobility Impaired (Adult and Pediatric)  Goal: *Acute Goals and Plan of Care (Insert Text)  STG:  (1.)Ms. Vieira will move from supine to sit and sit to supine , scoot up and down and roll side to side with STAND BY ASSIST within 3 day(s). (2.)Ms. Vieira will transfer from bed to chair and chair to bed with MINIMAL ASSIST using the least restrictive device within 3 day(s). (3.)Ms. Vieira will ambulate with MINIMAL ASSIST for 50+ feet with the least restrictive device within 3 day(s). (4.)Ms. Vieira will participate in therapeutic activity/exerices x 15 minutes for increased strength within 3 days. (5.)Ms. Vieira will ascend and descend 3 stairs using B hand rail(s) with CGA to improve functional mobility and safety within 3 day(s). LTG:  (1.)Ms. Vieira will move from supine to sit and sit to supine , scoot up and down and roll side to side in bed with MODIFIED INDEPENDENCE within 7 day(s). (2.)Ms. Vieira will transfer from bed to chair and chair to bed with SUPERVISION using the least restrictive device within 7 day(s). (3.)Ms. Vieira will ambulate with SUPERVISION for 150+ feet with the least restrictive device within 7 day(s). (4.)Ms. Vieira will participate in therapeutic activity/exerices x 20 minutes for increased strength within 7 days. (5.)Ms. Vieira will ascend and descend 3 stairs using B hand rail(s) with SUPERVISION to improve functional mobility and safety within 7 day(s).     ________________________________________________________________________________________________      PHYSICAL THERAPY: INITIAL ASSESSMENT, AM 2/20/2017  OBSERVATION: Hospital Day: 2  Payor: CARE IMPROVEMENT PLUS / [place] / Product Type: Managed Care Medicare /      NAME/AGE/GENDER: Rivas Murrieta is a 80 y.o. female      PRIMARY DIAGNOSIS: uti  uti Acute metabolic encephalopathy Acute metabolic encephalopathy        ICD-10: Treatment Diagnosis:       · Generalized Muscle Weakness (M62.81)  · Difficulty in walking, Not elsewhere classified (R26.2)  · Other abnormalities of gait and mobility (R26.89)  · Repeated Falls (R29.6)  · History of falling (Z91.81)   Precaution/Allergies:  Review of patient's allergies indicates no known allergies. ASSESSMENT:      Ms. Rad Espino is a pleasant 80 y.o. female in the hospital for the above with a history of multiple falls. She currently lives with her special needs daughter who she reports assists her with ADLs. Ms. Rad Espino presents to PT with generalized weakness in B LEs (hip flexors = 4-/5) and decreased AROM in L hip flexor. Ms. Rad Espino reports diminished sensation in B L4-S2 dermatomes. Pt also has observed swelling in L LE with reported pain in calf to palpation. RN alerted to suspected DVT and did not attempt OOB activity pending findings. Pt required min assist to come to sitting on EOB with fair balance. She reported pain of 9/10 in L chest upon sitting, to which RN was alerted, and discovered faint bruising along L lateral/posterior rib. Pt given min-mod assist to resume supine position and total assist for scooting up in bed. Pt appears to be functioning below her baseline at this time and STR might be a good option if pt gets admitted. Pt showed decreased use of R LE while in room and reports pain in R wrist, reportedly from a previus fall. Pt admits to at least 4 falls in the past month. Ms. Rad Espino could benefit from skilled PT at this time to address above deficits. This section established at most recent assessment   PROBLEM LIST (Impairments causing functional limitations):  1. Decreased Strength  2. Decreased Transfer Abilities  3. Decreased Ambulation Ability/Technique  4. Decreased Balance  5. Increased Pain    INTERVENTIONS PLANNED: (Benefits and precautions of physical therapy have been discussed with the patient.)  1. Balance Exercise  2. Bed Mobility  3. Gait Training  4.  Neuromuscular Re-education/Strengthening  5. Therapeutic Activites  6. Therapeutic Exercise/Strengthening  7. Transfer Training  8. Group Therapy      TREATMENT PLAN: Frequency/Duration: 3 times a week for duration of hospital stay  Rehabilitation Potential For Stated Goals: GOOD      RECOMMENDED REHABILITATION/EQUIPMENT: (at time of discharge pending progress): Continue Skilled Therapy. HISTORY:   History of Present Injury/Illness (Reason for Referral):  Per H&P:   HPI:   Patient  arrives to the emergency room in via EMS from home after having been found lying on the floor in her living room this morning. Patient states that she was in her recliner and negative attempting to get up to go to bed at about midnight when her feet slid out from under her she could not stop from falling and landed facedown on the floor. She complains of pain to the elbow into the left side of the face where she sustained an abrasion she denies any loss of consciousness or head injury she does suffer from chronic back pain for which she takes Ultram and Neurontin and states this does not feel at particular worse at this time however due to generalized weakness she was unable to get herself up off the floor and remained there until she was found by her special needs daughter this morning. She denies any recent illnesses and other than the pain in the right elbow in the face has no other complaints      Patient is a 80 y.o. female presenting with fall. The history is provided by the patient. Fall   The accident occurred 6 to 12 hours ago. The fall occurred while standing. She fell from a height of ground level. She landed on hard floor. There was no blood loss. Point of impact: hands and elbows. The pain is present in the right elbow (Left cheek). The pain is at a severity of 8/10. The pain is severe. She was not ambulatory at the scene. There was no entrapment after the fall. There was no drug use involved in the accident.  There was no alcohol use involved in the accident. Pertinent negatives include no visual change, no fever, no numbness, no bowel incontinence, no nausea, no vomiting, no hematuria, no headaches, no extremity weakness, no hearing loss, no loss of consciousness, no tingling and no laceration. The risk factors include being elderly. The symptoms are aggravated by activity. She has tried nothing for the symptoms. The treatment provided no relief  Pt confused poor historian. Past Medical History/Comorbidities:   Ms. Annita Skinner  has a past medical history of Arthritis; Headache(784.0); Hypertension; Ill-defined condition; Ill-defined condition; Psychiatric disorder; and Thyroid disease. Ms. Annita Skinner  has a past surgical history that includes cardiac surg procedure unlist; cholecystectomy; and orthopaedic. Social History/Living Environment:   Home Environment: Trailer/mobile home  # Steps to Enter: 5  Rails to Enter: Yes  Hand Rails : Bilateral  One/Two Story Residence: One story  Living Alone: No  Support Systems: Child(patsy) (special needs)  Patient Expects to be Discharged to[de-identified] Unknown  Current DME Used/Available at Home: Cane, quad  Tub or Shower Type: Tub/Shower combination  Prior Level of Function/Work/Activity:  Pt reports she lives with her special needs daughter in a mobile home with 5 steps to enter. She reports using a quad cane for ambulation and getting assistance from her daughter for ADLs. Pt has had numerous falls in the past month.       Number of Personal Factors/Comorbidities that affect the Plan of Care:  · Psychiatric disorder  · Little home support  · Multiple falls 3+: HIGH COMPLEXITY   EXAMINATION:   Most Recent Physical Functioning:   Gross Assessment:  AROM: Generally decreased, functional (L hip flexion)  Strength: Generally decreased, functional (bilateral hip flexion)  Sensation: Impaired (B L5-S2)               Posture:     Balance:  Sitting: Impaired  Sitting - Static: Fair (occasional)  Sitting - Dynamic: Prop sitting Bed Mobility:  Rolling: Minimum assistance; Moderate assistance  Supine to Sit: Minimum assistance  Sit to Supine: Minimum assistance  Scooting: Total assistance  Wheelchair Mobility:     Transfers:  Sit to Stand:  (Not attempted given suspected L LE DVT)  Gait:             Body Structures Involved:  1. Nerves  2. Metabolic  3. Muscles Body Functions Affected:  1. Sensory/Pain  2. Neuromusculoskeletal  3. Movement Related  4. Metobolic/Endocrine Activities and Participation Affected:  1. General Tasks and Demands  2. Mobility  3. Community, Social and [place]   Number of elements that affect the Plan of Care: 4+: HIGH COMPLEXITY   CLINICAL PRESENTATION:   Presentation: Evolving clinical presentation with changing clinical characteristics: MODERATE COMPLEXITY   CLINICAL DECISION MAKIN [facility] Inpatient Short Form  How much difficulty does the patient currently have. .. Unable A Lot A Little None   1. Turning over in bed (including adjusting bedclothes, sheets and blankets)? [ ] 1   [ ] 2   [X] 3   [ ] 4   2. Sitting down on and standing up from a chair with arms ( e.g., wheelchair, bedside commode, etc.)   [ ] 1   [ ] 2   [X] 3   [ ] 4   3. Moving from lying on back to sitting on the side of the bed? [ ] 1   [ ] 2   [X] 3   [ ] 4   How much help from another person does the patient currently need. .. Total A Lot A Little None   4. Moving to and from a bed to a chair (including a wheelchair)? [ ] 1   [X] 2   [ ] 3   [ ] 4   5. Need to walk in hospital room? [ ] 1   [X] 2   [ ] 3   [ ] 4   6. Climbing 3-5 steps with a railing? [ ] 1   [X] 2   [ ] 3   [ ] 4   © , Trustees of [address] [place] [address] [address] [address], under license to [employer]. All rights reserved    Score:  Initial: 15 Most Recent: X (Date: -- )     Interpretation of Tool:  Represents activities that are increasingly more difficult (i.e. Bed mobility, Transfers, Gait).        Score 24 23 22-20 19-15 14-10 9-7 6       Modifier CH CI CJ CK CL CM CN         · Mobility - Walking and Moving Around:               - CURRENT STATUS:    CK - 40%-59% impaired, limited or restricted               - GOAL STATUS:           CJ - 20%-39% impaired, limited or restricted               - D/C STATUS:                       ---------------To be determined---------------  Payor: CARE IMPROVEMENT PLUS / [place] / Product Type: [employer] Medicare /       Medical Necessity:     · Patient demonstrates good rehab potential due to higher previous functional level. Reason for Services/Other Comments:  · Patient continues to require skilled intervention due to balance impairments and decreased functional mobility. Use of outcome tool(s) and clinical judgement create a POC that gives a: Questionable prediction of patient's progress: MODERATE COMPLEXITY                 TREATMENT:   (In addition to Assessment/Re-Assessment sessions the following treatments were rendered)   Pre-treatment Symptoms/Complaints:  L ankle/foot pain  Pain: Initial:   Pain Intensity 1: 6  Pain Location 1: Ankle, Foot  Pain Orientation 1: Left  Post Session:  8/10      Assessment/Reassessment only, no treatment provided today     Braces/Orthotics/Lines/Etc:   · IV  · O2 Device: Room air  Treatment/Session Assessment:    · Response to Treatment:  Pt did not tolerate treatment very well given as she had increases rib/chest pain with sitting and L LE calf pain. · Interdisciplinary Collaboration:  · Physical Therapist  · Occupational Therapist  · Registered Nurse  · After treatment position/precautions:  · Supine in bed  · Bed/Chair-wheels locked  · Bed in low position  · Call light within reach  · Compliance with Program/Exercises: Will assess as treatment progresses. · Recommendations/Intent for next treatment session:   \"Next visit will focus on advancements to more challenging activities and reduction in assistance provided\".   Total Treatment Duration:  PT Patient Time In/Time Out  Time In: 1006  Time Out: 175 [facility] Ravi PT, DPT

## 2017-02-20 NOTE — PROGRESS NOTES
Problem: Self Care Deficits Care Plan (Adult)  Goal: *Acute Goals and Plan of Care (Insert Text)  1. Patient will complete lower body bathing and dressing with setup and adaptive equipment as needed. 2. Patient will complete toileting with supervision. 3. Patient will tolerate 30 minutes of OT treatment with 2 rest breaks to increase activity tolerance for ADLs. 4. Patient will complete functional transfers with CGA and adaptive equipment as needed. 5. Patient will complete grooming with setup and adaptive equipment as needed. Timeframe: 7 visits       OCCUPATIONAL THERAPY: Initial Assessment, Daily Note and Treatment Day: 1st 2/20/2017  OBSERVATION: Hospital Day: 2  Payor: CARE IMPROVEMENT PLUS / [place] / Product Type: [employer] Medicare /      NAME/AGE/GENDER: Ariana Leung is a 80 y.o. female      PRIMARY DIAGNOSIS:  uti  uti Acute metabolic encephalopathy Acute metabolic encephalopathy        ICD-10: Treatment Diagnosis:        · Generalized Muscle Weakness (M62.81)  · Other lack of cordination (R27.8)  · History of falling (Z91.81)  · Dizziness and Giddiness (R42)   Precautions/Allergies:         Review of patient's allergies indicates no known allergies. ASSESSMENT:      Ms. Carmel Lopez presents supine in bed, agreeable to therapy. Pt appears slightly anxious with c/o pain in L shoulder, elbow, and LLE post fall. Her LUE ROM is decreased, somewhat functional; BUE strength is decreased 3+/5 RUE, 3/5 LUE. She requires moderate assistance for bed mobility, c/o pain with movement and requests laying back down after attempting to sit edge of bed. Pt reports PT just finished and she feels tired. Pt became tearful with discussion about POC. Her daughter is disabled and admittedly pt is less able to assist with her care as well as her own.   Discussed placement options for both pt and her daughter secondary to decrease ability to perform self care, mobility, and safety; MIREYA and MD notified. Cog/Behavioral intervention required for pt to recognize and address deficits. Right shoulder sensitive to palpation at deltoid head, triceps, and joint space; MD agrees to xray. Ms. Purvis Goldberg presents with decreased activity tolerance, and decreased independence for self care, functional mobility, and ADL's. Requires skilled OT to maximize independence with self care tasks and functional transfers. Pt returned to supine with HOB raised and all needs in reach, pt instructed to call for assistance; RN aware. Ms. Purvis Goldberg was discharged from our facility after her initial evaluation with treatment/intervention but before further treatment could be provided in this setting. This section established at most recent assessment   PROBLEM LIST (Impairments causing functional limitations):  1. Decreased Strength  2. Decreased ADL/Functional Activities  3. Decreased Transfer Abilities  4. Decreased Ambulation Ability/Technique  5. Decreased Balance  6. Increased Pain  7. Decreased Activity Tolerance  8. Decreased Work Simplification/Energy Conservation Techniques  9. Decreased Flexibility/Joint Mobility  10. Decreased [place] with Home Exercise Program    INTERVENTIONS PLANNED: (Benefits and precautions of occupational therapy have been discussed with the patient.)  1. Activities of daily living training  2. Adaptive equipment training  3. Balance training  4. Clothing management  5. Donning&doffing training  6. Group therapy  7. Manual therapy training  8. Neuromuscular re-eduation  9. Sensory reintergration training  10. Splinting  11. Theraputic activity  12. Theraputic exercise      TREATMENT PLAN: Frequency/Duration: Follow patient 3x/week to address above goals. Rehabilitation Potential For Stated Goals: GOOD      RECOMMENDED REHABILITATION/EQUIPMENT: (at time of discharge pending progress): Continue Skilled Therapy and Rehab.                       OCCUPATIONAL PROFILE AND HISTORY: History of Present Injury/Illness (Reason for Referral):  Patient  arrives to the emergency room in via EMS from home after having been found lying on the floor in her living room this morning. Patient states that she was in her recliner and negative attempting to get up to go to bed at about midnight when her feet slid out from under her she could not stop from falling and landed facedown on the floor. She complains of pain to the elbow into the left side of the face where she sustained an abrasion she denies any loss of consciousness or head injury she does suffer from chronic back pain for which she takes Ultram and Neurontin and states this does not feel at particular worse at this time however due to generalized weakness she was unable to get herself up off the floor and remained there until she was found by her special needs daughter this morning. She denies any recent illnesses and other than the pain in the right elbow in the face has no other complaints      Patient is a 80 y.o. female presenting with fall. The history is provided by the patient. Fall   The accident occurred 6 to 12 hours ago. The fall occurred while standing. She fell from a height of ground level. She landed on hard floor. There was no blood loss. Point of impact: hands and elbows. The pain is present in the right elbow (Left cheek). The pain is at a severity of 8/10. The pain is severe. She was not ambulatory at the scene. There was no entrapment after the fall. There was no drug use involved in the accident. There was no alcohol use involved in the accident. Pertinent negatives include no visual change, no fever, no numbness, no bowel incontinence, no nausea, no vomiting, no hematuria, no headaches, no extremity weakness, no hearing loss, no loss of consciousness, no tingling and no laceration. The risk factors include being elderly. The symptoms are aggravated by activity. She has tried nothing for the symptoms.  The treatment provided no relief  Pt confused poor historian. Past Medical History/Comorbidities:   Ms. Sami Fish  has a past medical history of Arthritis; Headache(784.0); Hypertension; Ill-defined condition; Ill-defined condition; Psychiatric disorder; and Thyroid disease. Ms. Sami Fish  has a past surgical history that includes cardiac surg procedure unlist; cholecystectomy; and orthopaedic. Social History/Living Environment:   Home Environment: Trailer/mobile home  # Steps to Enter: 5  Rails to Enter: Yes  Hand Rails : Bilateral  One/Two Story Residence: One story  Living Alone: No  Support Systems: Child(patsy) (special needs)  Patient Expects to be Discharged to[de-identified] Unknown  Current DME Used/Available at Home: Shower chair  Tub or Shower Type: Tub/Shower combination  Prior Level of Function/Work/Activity:  Modified independent  Personal Factors:          Social Background:  Lives with disabled daughter, neighbor/friend support. Sister and her son live in [address] [place] [address]. Other factors that influence how disability is experienced by the patient:  Pt is an advocate for her daughter. Daughter attended [employer] for 2 years and then transferred to [employer] (?) special classes prior to graduating at 21 from [employer] (school for special needs). She has taken care of her daughter since. Pt reports having to setup/supervise and direct her daughter's self care 24/7. She is quite concerned that her daughter be taken care of in the event that she is unable to. Number of Personal Factors/Comorbidities that affect the Plan of Care: Extensive review of physical, cognitive, and psychosocial performance (3+):  HIGH COMPLEXITY   ASSESSMENT OF OCCUPATIONAL PERFORMANCE[de-identified]   Activities of Daily Living:         Requires assistance  Basic ADLs (From Assessment) Complex ADLs (From Assessment)   Basic ADL  Feeding: Setup  Oral Facial Hygiene/Grooming: Contact guard assistance  Bathing:  Moderate assistance  Upper Body Dressing: Setup  Lower Body Dressing: Moderate assistance  Toileting: Minimum assistance Instrumental ADL  Meal Preparation: Moderate assistance  Homemaking: Maximum assistance  Medication Management: Contact guard assistance  Financial Management: Contact guard assistance   Grooming/Bathing/Dressing Activities of Daily Living   Grooming  Grooming Assistance: Contact guard assistance  Washing Face: Contact guard assistance  Washing Hands: Supervision/set-up Cognitive Retraining  Problem Solving: Identifying the problem; Identifying the task;General alternative solution;Deductive reason  Executive Functions: Executing cognitive plans;Regulating behavior  Organizing/Sequencing: Breaking task down;Prioritizing  Safety/Judgement: Fall prevention;Decreased insight into deficits; Decreased awareness of need for safety;Decreased awareness of need for assistance  Cues: Tactile cues provided;Verbal cues provided;Visual cues provided                       Bed/Mat Mobility  Rolling: Moderate assistance  Supine to Sit: Moderate assistance  Sit to Supine: Moderate assistance  Sit to Stand: Other (comment) (Unable to secondary to c/o pain LLE)  Scooting: Total assistance          Most Recent Physical Functioning:   Gross Assessment:  AROM: Generally decreased, functional  Strength: Generally decreased, functional               Posture:     Balance:  Sitting: Impaired  Sitting - Static: Fair (occasional)  Sitting - Dynamic: Fair (occasional) Bed Mobility:  Rolling: Moderate assistance  Supine to Sit: Moderate assistance  Sit to Supine: Moderate assistance  Scooting: Total assistance  Wheelchair Mobility:     Transfers:  Sit to Stand:  Other (comment) (Unable to secondary to c/o pain LLE)                 Patient Vitals for the past 6 hrs:       BP SpO2 Pulse   02/20/17 1108 139/63 97 % 82   02/20/17 1452 140/55 96 % 76        Mental Status  Neurologic State: Alert  Orientation Level: Oriented X4  Cognition: Follows commands  Perception: Appears intact  Perseveration: No perseveration noted  Safety/Judgement: Fall prevention, Decreased insight into deficits, Decreased awareness of need for safety, Decreased awareness of need for assistance                               Physical Skills Involved:  1. Range of Motion  2. Balance  3. Mobility  4. Strength  5. Endurance  6. Fine or Gross Motor Coordination  7. Dexterity Cognitive Skills Affected (resulting in the inability to perform in a timely and safe manner):  1. Attending  2. Perceiving  3. Thinking  4. Understanding  5. Problem Solving Psychosocial Skills Affected:  1. Interpersonal Interactions  2. Habits  3. Routines and Behaviors  4. Active Use of Coping Strategies  5. Environmental Adaptations   Number of elements that affect the Plan of Care: 5+:  HIGH COMPLEXITY   CLINICAL DECISION MAKIN [facility] Mobility Inpatient Short Form  How much help from another person does the patient currently need. .. Total A Lot A Little None   1. Putting on and taking off regular lower body clothing?   [ ] 1   [X] 2   [ ] 3   [ ] 4   2. Bathing (including washing, rinsing, drying)? [ ] 1   [X] 2   [ ] 3   [ ] 4   3. Toileting, which includes using toilet, bedpan or urinal?   [ ] 1   [X] 2   [ ] 3   [ ] 4   4. Putting on and taking off regular upper body clothing?   [ ] 1   [ ] 2   [X] 3   [ ] 4   5. Taking care of personal grooming such as brushing teeth? [ ] 1   [ ] 2   [X] 3   [ ] 4   6. Eating meals? [ ] 1   [ ] 2   [X] 3   [ ] 4   © , Trustees of [address] [place] [address] [address] [address], under license to [employer]. All rights reserved    Score:  Initial: 15 Most Recent: X (Date: -- )     Interpretation of Tool:  Represents activities that are increasingly more difficult (i.e. Bed mobility, Transfers, Gait).        Score 24 23 22-20 19-15 14-10 9-7 6       Modifier CH CI CJ CK CL CM CN         · Self Care:               - CURRENT STATUS:    CK - 40%-59% impaired, limited or restricted               - GOAL STATUS:           CJ - 20%-39% impaired, limited or restricted               - D/C STATUS:                       ---------------To be determined---------------  Payor: CARE IMPROVEMENT PLUS [place] / Product Type: Managed Care Medicare /       Medical Necessity:     · Patient demonstrates good rehab potential due to higher previous functional level. Reason for Services/Other Comments:  · Patient continues to require skilled intervention due to decreased ability to perform self care. Use of outcome tool(s) and clinical judgement create a POC that gives a: MODERATE COMPLEXITY             TREATMENT:   (In addition to Assessment/Re-Assessment sessions the following treatments were rendered)      Pre-treatment Symptoms/Complaints:  Pain, Decreased ability to perform ADLs, self care, and functional mobility; decreased tolerance for activities  Pain: Initial:   Pain Intensity 1:  (pt reports \"10, well maybe not that bad. But, it hurts! \")  Pain Location 1: Arm, Elbow, Shoulder  Pain Orientation 1: Left  Pain Intensity 2:  (pt reports \"10, well maybe not that bad. But, it hurts! \")  Post Session:  \"better, but still hurts\"      Therapeutic Activity: (    10 minutes): Therapeutic activities including Bed transfers to improve mobility, strength and coordination. Required moderate  verbal and tactile cueing to promote dynamic balance in sitting and promote coordination of bilateral, upper extremity(s), lower extremity(s). Cognitive Skills Development: (15 minutes): Procedure(s) utilized to improve and/or restore cognitive functioning as related to problem solving skills, ability to compute, safety awareness, compensatory strategies and self awareness. Required moderate verbal cueing to facilitate improve recall of information and improve comprehension and understanding. .  Therapeutic activity and cognitive skills development performed post initial assessment allowing for separate and distinct clarification between interventions and evaluation. Assessment     Braces/Orthotics/Lines/Etc:   · IV  · O2 Device: Room air  Treatment/Session Assessment:    · Response to Treatment:  Agrees to OT  · Interdisciplinary Collaboration:  · Physical Therapist  · Occupational Therapist  · Registered Nurse  · Physician  ·   · After treatment position/precautions:  · Supine in bed  · Bed/Chair-wheels locked  · Bed in low position  · Call light within reach  · RN notified  · MD at bedside  · Compliance with Program/Exercises: Will assess as treatment progresses. · Recommendations/Intent for next treatment session: \"Next visit will focus on advancements to more challenging activities and reduction in assistance provided\".   Total Treatment Duration: 39 minutes  OT Patient Time In/Time Out  Time In: 1037  Time Out: Clemencia Redding 33, OTR/L

## 2017-02-20 NOTE — PROGRESS NOTES
Care Management Interventions  PCP Verified by CM: Yes  Transition of Care Consult (CM Consult): Discharge Planning  Physical Therapy Consult: Yes  Occupational Therapy Consult: Yes  Current Support Network: Own Home, Other (Lives with 47 yr old special needs daughter and remains her primary caretaker. )  Confirm Follow Up Transport: Other (see comment) ([facility])  Plan discussed with Pt/Family/Caregiver: Yes  Freedom of Choice Offered: Yes  Discharge Location  Discharge Placement: Skilled nursing facility     Met with patient at the bedside. She is alert and oriented times four. Long, long conversation. Patient is 80 yrs old and the primary caretaker of her daughter. She and daughter actually helped each other to survive. Daughter is child-like and follow commands 1-2 steps at a time. As patient aged, she would direct her daughter on how to assist her with some adl's or lifting her leg to a footstool, or assiting her out of a deep chair, etc.     [place] are also instrumental in this duo surviving for so long in their home. They provided housework, meals at times, transport at times, general fix it help, etc. They checked on patient and daughter all the time and have taken Pedro Hilario (daughter) into her home. Neighbor Florentino and her spouse = [phone]  Cheli Penaloza number at this time. Patient has a  son. She workd for [employer].  in . Ex dies 7 years ago and was never accepting of his daughter's handicap. Sister pf patient is Dedra Enciso and she was just moved by her children to [facility] and patient has not been able to see her in several months. Was able to connect the two by phone. Normally uses a four pronged cane. Has stairs in the home that are a hardship on patient and she had two sets of railings installed to use to go up and down. Plan: Patient is OBS and signed her OBS paperwork. Letter placed in the chart.    Will refer to [facility], [facility] and [facility]. Pt and OT and PPD placed. OTHER:   This elderly woman needs assistance with placing her daughter in a special needs environment and making plans for the rest of her daughter's life. She is realistic and very sad about this. Will see what we can do working with [facility] to make sure daughter is cared for. Rolanda Townsend

## 2017-02-21 VITALS
TEMPERATURE: 98.8 F | HEIGHT: 65 IN | RESPIRATION RATE: 18 BRPM | SYSTOLIC BLOOD PRESSURE: 133 MMHG | BODY MASS INDEX: 26.66 KG/M2 | HEART RATE: 78 BPM | OXYGEN SATURATION: 97 % | WEIGHT: 160 LBS | DIASTOLIC BLOOD PRESSURE: 64 MMHG

## 2017-02-21 LAB
ANION GAP BLD CALC-SCNC: 8 MMOL/L (ref 7–16)
BACTERIA SPEC CULT: ABNORMAL
BUN SERPL-MCNC: 20 MG/DL (ref 8–23)
CALCIUM SERPL-MCNC: 9.3 MG/DL (ref 8.3–10.4)
CHLORIDE SERPL-SCNC: 113 MMOL/L (ref 98–107)
CK SERPL-CCNC: 273 U/L (ref 21–215)
CO2 SERPL-SCNC: 26 MMOL/L (ref 21–32)
CREAT SERPL-MCNC: 1.08 MG/DL (ref 0.6–1)
GLUCOSE SERPL-MCNC: 85 MG/DL (ref 65–100)
MM INDURATION POC: NORMAL MM (ref 0–5)
POTASSIUM SERPL-SCNC: 4.2 MMOL/L (ref 3.5–5.1)
PPD POC: NORMAL NEGATIVE
SERVICE CMNT-IMP: ABNORMAL
SODIUM SERPL-SCNC: 147 MMOL/L (ref 136–145)

## 2017-02-21 PROCEDURE — 36415 COLL VENOUS BLD VENIPUNCTURE: CPT | Performed by: INTERNAL MEDICINE

## 2017-02-21 PROCEDURE — 96361 HYDRATE IV INFUSION ADD-ON: CPT

## 2017-02-21 PROCEDURE — 97110 THERAPEUTIC EXERCISES: CPT

## 2017-02-21 PROCEDURE — 74011250637 HC RX REV CODE- 250/637: Performed by: INTERNAL MEDICINE

## 2017-02-21 PROCEDURE — 82550 ASSAY OF CK (CPK): CPT | Performed by: INTERNAL MEDICINE

## 2017-02-21 PROCEDURE — G8980 MOBILITY D/C STATUS: HCPCS

## 2017-02-21 PROCEDURE — 99218 HC RM OBSERVATION: CPT

## 2017-02-21 PROCEDURE — 80048 BASIC METABOLIC PNL TOTAL CA: CPT | Performed by: INTERNAL MEDICINE

## 2017-02-21 PROCEDURE — G8989 SELF CARE D/C STATUS: HCPCS

## 2017-02-21 PROCEDURE — 97530 THERAPEUTIC ACTIVITIES: CPT

## 2017-02-21 PROCEDURE — G8979 MOBILITY GOAL STATUS: HCPCS

## 2017-02-21 RX ORDER — TRAMADOL HYDROCHLORIDE 50 MG/1
50 TABLET ORAL
Qty: 20 TAB | Refills: 0 | Status: SHIPPED | OUTPATIENT
Start: 2017-02-21 | End: 2018-09-28

## 2017-02-21 RX ORDER — CEPHALEXIN 500 MG/1
500 CAPSULE ORAL 4 TIMES DAILY
Qty: 20 CAP | Refills: 0 | Status: SHIPPED
Start: 2017-02-21 | End: 2017-02-26

## 2017-02-21 RX ADMIN — LEVOTHYROXINE SODIUM 75 MCG: 75 TABLET ORAL at 05:26

## 2017-02-21 RX ADMIN — TRAMADOL HYDROCHLORIDE 50 MG: 50 TABLET, FILM COATED ORAL at 16:10

## 2017-02-21 RX ADMIN — NYSTATIN: 100000 POWDER TOPICAL at 08:33

## 2017-02-21 RX ADMIN — GABAPENTIN 600 MG: 300 CAPSULE ORAL at 08:33

## 2017-02-21 NOTE — PROGRESS NOTES
Care Management Interventions  PCP Verified by CM: Yes  Transition of Care Consult (CM Consult): SNF  Physical Therapy Consult: Yes  Occupational Therapy Consult: Yes  Current Support Network: Own Home, Other (Lives with 47 yr old special needs daughter and remains her primary caretaker. )  Confirm Follow Up Transport: Other (see comment) Fabian Douglass)  Plan discussed with Pt/Family/Caregiver: Yes  Freedom of Choice Offered: Yes  Discharge Location  Discharge Placement: Skilled nursing facility     [facility]. Patient chose this facility because she was there once before. Called and left a message with neighbor Stephane Manrique [phone] so that the neighbors would know where she was. They are caring for her daughter. Tyra Esteves to transport. Sina Whittington

## 2017-02-21 NOTE — PROGRESS NOTES
TRANSFER - OUT REPORT:    Verbal report given to Dany RN(name) on Marta Vieira  being transferred to [facility]) for routine progression of care       Report consisted of patients Situation, Background, Assessment and   Recommendations(SBAR). Information from the following report(s) SBAR and Kardex was reviewed with the receiving nurse. Lines:       Opportunity for questions and clarification was provided.       Patient transported with:

## 2017-02-21 NOTE — PROGRESS NOTES
Pt IV taken out. Cath tip intact. Pt is aware of transfer to [facility]. Awaiting transport.  Report called to [facility]

## 2017-02-21 NOTE — DISCHARGE SUMMARY
Physician Discharge Summary       Patient: Dani Avila MRN: [id number]  SSN: [id number]    YOB: 1930  Age: 80 y.o. Sex: female    PCP: German Ren MD    Admit date: 2017  Admitting Provider: Yenifer Vieira MD    Discharge date: 2017  Discharging Provider: Justa Ruiz MD    * Admission Diagnoses: uti  uti    * Discharge Diagnoses:    Hospital Problems as of 2017  Date Reviewed: 2016          Codes Class Noted - Resolved POA    Cellulitis of left lower extremity ICD-10-CM: L03.116  ICD-9-CM: 682.6  2017 - Present Yes        Acute cystitis without hematuria ICD-10-CM: N30.00  ICD-9-CM: 595.0  2017 - Present Yes        * (Principal)Acute metabolic encephalopathy ABBE-08-NU: G93.41  ICD-9-CM: 348.31  2017 - Present Yes        Rhabdomyolysis ICD-10-CM: Y67.79  ICD-9-CM: 728.88  2017 - Present Yes        CKD (chronic kidney disease) stage 3, GFR 30-59 ml/min ICD-10-CM: N18.3  ICD-9-CM: 585.3  2017 - Present Unknown        Venous stasis dermatitis ICD-10-CM: I83.10  ICD-9-CM: 454.1  2017 - Present Yes              * Hospital Course:     Ms. Rad Espino is an 80year old white female who presented  after being found down on her floor at home and was found to have a UTI and to be in mild rhabdomyolysis. She slid out of her chair and was in the floor from around midnight until she was found the next morning. Initial CK above 800, has decreased with IVFs. Urine culture growing pan sensitive e coli and she is being changed to oral keflex from IV rocephin to complete her antibiotic course. She is now medically stable for discharge to [facility] for continued rehab and therapies to get stronger.      Significant Diagnostic Studies:     Transthoracic Echocardiogram  2D, M-mode, Doppler, and Color Doppler    Patient: Rae Vera  MR #: [id number]  : 1930  Age: 80 years  Gender: Female  Study date: 2017  Account #: [id number]  Height: 65 in  Weight: 159.7 lb  BSA: 1.8 mï¾²  Status:Routine  Location: 618  BP: 139/ 54    Allergies: NO KNOWN ALLERGIES    Sonographer: Patrick Rao RVT, [facility]  Group:  [facility]  Referring Physician: Esau Gupta MD  Reading Physician: Christian Ruiz MD    INDICATIONS: Presumed syncope. PROCEDURE: This was a routine study. A transthoracic echocardiogram was  performed. The study included complete 2D imaging, M-mode, complete spectral  Doppler, and color Doppler. Image quality was good. LEFT VENTRICLE: Size was normal. Systolic function was normal. Ejection  fraction was estimated in the range of 70 % to 75 %. There were no regional  wall motion abnormalities. Wall thickness was normal. Left ventricular  diastolic function parameters were indeterminate. RIGHT VENTRICLE: Not well visualized. LEFT ATRIUM: Size was normal.    RIGHT ATRIUM: Not well visualized. SYSTEMIC VEINS: IVC: The inferior vena cava was normal in size and course. AORTIC VALVE: The valve was probably trileaflet. The valve was not well  visualized. There was no evidence for stenosis. There was no insufficiency. MITRAL VALVE: Valve structure was normal. There was no evidence for stenosis. There was no regurgitation. TRICUSPID VALVE: The valve structure was normal. There was no evidence for  stenosis. There was mild to moderate regurgitation. PULMONIC VALVE: Not well visualized. There was no evidence for stenosis. There  was no insufficiency. PERICARDIUM: There is anterior echo free space that may represent a fat pad   or  a small pericardial effusion. AORTA: The root exhibited normal size. SUMMARY:    -  Left ventricle: Systolic function was normal. Ejection fraction was  estimated in the range of 70 % to 75 %. There were no regional wall motion  abnormalities. -  Tricuspid valve: There was mild to moderate regurgitation.     SYSTEM MEASUREMENT TABLES    2D mode  LA Dimension (2D): 3.3 cm  Left Atrium Systolic Volume Index; Method of Disks, Biplane; 2D mode;: 15   ml/m2  IVS/LVPW (2D): 0.9  IVSd (2D): 1.1 cm  LVIDd (2D): 3.9 cm  LVIDs (2D): 2.6 cm  LVOT Area (2D): 2.8 cm2  LVPWd (2D): 1.1 cm    Unspecified Scan Mode  Peak Grad; Mean; Antegrade Flow: 5 mm[Hg]  Vmax; Antegrade Flow: 113 cm/s  LVOT Diam: 1.9 cm  RVSP: 30 mm[Hg]    Prepared and signed by    Claudette Arbour, MD  Signed [id number] 18:48:50      CT HEAD WITHOUT CONTRAST.     INDICATION: Head injury.     COMPARISON: 18 November 2016.      TECHNIQUE: 5 mm axial scans from the skull base to the vertex. Our CT  scanners use one or more of the following: Automated exposure control,  adjustment of the mA and or kV according to patient size, iterative  reconstruction.     FINDINGS: No acute intraparenchymal hemorrhage or abnormal extra-axial fluid  collection. The ventricles are normal size. Moderate white matter low  attenuation is present, nonspecific, likely chronic small vessel disease. No  midline shift or mass effect. Included portion of the paranasal sinuses and  orbits grossly unremarkable.     IMPRESSION  IMPRESSION: Negative for acute intracranial abnormality. Chronic changes. Discharge Exam:    General: awake, alert, oriented, no acute distress  Eyes; non icteric, EOMI  Neck; supple  CV: RRR  Pulm; CTAB  Abd; soft, non tender, non distended    * Discharge Condition: stable  * Disposition: STR at [facility]    Discharge Medications:  Current Discharge Medication List      START taking these medications    Details   cephALEXin (KEFLEX) 500 mg capsule Take 1 Cap by mouth four (4) times daily for 5 days. Qty: 20 Cap, Refills: 0         CONTINUE these medications which have CHANGED    Details   traMADol (ULTRAM) 50 mg tablet Take 1 Tab by mouth every six (6) hours as needed for Pain. Max Daily Amount: 200 mg.  Indications: Pain  Qty: 20 Tab, Refills: 0         CONTINUE these medications which have NOT CHANGED Details   acetaminophen (TYLENOL) 325 mg tablet Take 2 Tabs by mouth every six (6) hours as needed. Qty: 30 Tab, Refills: 0      levothyroxine (SYNTHROID) 75 mcg tablet Take 1 Tab by mouth Daily (before breakfast). Qty: 30 Tab, Refills: 5      gabapentin (NEURONTIN) 600 mg tablet Take 1 Tab by mouth three (3) times daily. Qty: 90 Tab, Refills: 5             * Follow-up Care/Patient Instructions: Activity: as directed by PT  Diet: regular    Follow-up Information     Follow up With Details Comments Carie Motta MD Schedule an appointment as soon as possible for a visit after discharge from [address] [place] [address]  [phone]          35 minutes spent in discharge planning and coordination of care.      Signed:  Mikala Gonzalez MD  2/21/2017  2:30 PM

## 2017-02-21 NOTE — PROGRESS NOTES
Problem: Mobility Impaired (Adult and Pediatric)  Goal: *Acute Goals and Plan of Care (Insert Text)  STG:  (1.)Ms. Vieira will move from supine to sit and sit to supine , scoot up and down and roll side to side with STAND BY ASSIST within 3 day(s). (2.)Ms. Vieira will transfer from bed to chair and chair to bed with MINIMAL ASSIST using the least restrictive device within 3 day(s). (3.)Ms. Vieira will ambulate with MINIMAL ASSIST for 50+ feet with the least restrictive device within 3 day(s). (4.)Ms. Vieira will participate in therapeutic activity/exerices x 15 minutes for increased strength within 3 days. (5.)Ms. Vieira will ascend and descend 3 stairs using B hand rail(s) with CGA to improve functional mobility and safety within 3 day(s). LTG:  (1.)Ms. Vieira will move from supine to sit and sit to supine , scoot up and down and roll side to side in bed with MODIFIED INDEPENDENCE within 7 day(s). (2.)Ms. Vieira will transfer from bed to chair and chair to bed with SUPERVISION using the least restrictive device within 7 day(s). (3.)Ms. Vieira will ambulate with SUPERVISION for 150+ feet with the least restrictive device within 7 day(s). (4.)Ms. Vieira will participate in therapeutic activity/exerices x 20 minutes for increased strength within 7 days. (5.)Ms. Vieira will ascend and descend 3 stairs using B hand rail(s) with SUPERVISION to improve functional mobility and safety within 7 day(s).     ________________________________________________________________________________________________      PHYSICAL THERAPY: Daily Note, Treatment Day: 1st and AM 2/21/2017  OBSERVATION: Hospital Day: 3  Payor: CARE IMPROVEMENT PLUS [place] / Product Type: [employer] Medicare /      NAME/AGE/GENDER: Giancarlo Weeks is a 80 y.o. female      PRIMARY DIAGNOSIS: uti  uti Acute metabolic encephalopathy Acute metabolic encephalopathy        ICD-10: Treatment Diagnosis:       · Generalized Muscle Weakness (M62.81)  · Difficulty in walking, Not elsewhere classified (R26.2)  · Other abnormalities of gait and mobility (R26.89)  · Repeated Falls (R29.6)  · History of falling (Z91.81)   Precaution/Allergies:  Review of patient's allergies indicates no known allergies. ASSESSMENT:      Ms. Maeve Simpson was supine in bed upon arrival and agreeable to PT. She continues to report L rib pain. Pt able to perform bed mobility today with CGA and verbal/visual cues. She still has fair sitting balance but was able to stand with mod assist and RW from raised bed. Pt transferred to bedside chair with CGA-Amna/RW demonstrating slow, shuffled steps. Pt also observed to use step-to gait pattern leading with her R LE. Pt given min assist to sit in bedside chair where she was able to participate in LE strengthening. Pt exhibits decreased attention and requires numerous cues throughout there-ex for correct form/action. Pt required mod assist x 2 for STS from bedside chair given lower chair height. Pt has progressed since yesterday as she required less assistance for bed mobility and was able to participate in there-ex. Will continue POC. Ms. Maeve Simpson was discharged from our facility before further treatment could be provided in this setting. This section established at most recent assessment   PROBLEM LIST (Impairments causing functional limitations):  1. Decreased Strength  2. Decreased Transfer Abilities  3. Decreased Ambulation Ability/Technique  4. Decreased Balance  5. Increased Pain    INTERVENTIONS PLANNED: (Benefits and precautions of physical therapy have been discussed with the patient.)  1. Balance Exercise  2. Bed Mobility  3. Gait Training  4. Neuromuscular Re-education/Strengthening  5. Therapeutic Activites  6. Therapeutic Exercise/Strengthening  7. Transfer Training  8.  Group Therapy      TREATMENT PLAN: Frequency/Duration: 3 times a week for duration of hospital stay  Rehabilitation Potential For Stated Goals: GOOD      RECOMMENDED REHABILITATION/EQUIPMENT: (at time of discharge pending progress): Continue Skilled Therapy. HISTORY:   History of Present Injury/Illness (Reason for Referral):  Per H&P:   HPI:   Patient  arrives to the emergency room in via EMS from home after having been found lying on the floor in her living room this morning. Patient states that she was in her recliner and negative attempting to get up to go to bed at about midnight when her feet slid out from under her she could not stop from falling and landed facedown on the floor. She complains of pain to the elbow into the left side of the face where she sustained an abrasion she denies any loss of consciousness or head injury she does suffer from chronic back pain for which she takes Ultram and Neurontin and states this does not feel at particular worse at this time however due to generalized weakness she was unable to get herself up off the floor and remained there until she was found by her special needs daughter this morning. She denies any recent illnesses and other than the pain in the right elbow in the face has no other complaints      Patient is a 80 y.o. female presenting with fall. The history is provided by the patient. Fall   The accident occurred 6 to 12 hours ago. The fall occurred while standing. She fell from a height of ground level. She landed on hard floor. There was no blood loss. Point of impact: hands and elbows. The pain is present in the right elbow (Left cheek). The pain is at a severity of 8/10. The pain is severe. She was not ambulatory at the scene. There was no entrapment after the fall. There was no drug use involved in the accident. There was no alcohol use involved in the accident.  Pertinent negatives include no visual change, no fever, no numbness, no bowel incontinence, no nausea, no vomiting, no hematuria, no headaches, no extremity weakness, no hearing loss, no loss of consciousness, no tingling and no laceration. The risk factors include being elderly. The symptoms are aggravated by activity. She has tried nothing for the symptoms. The treatment provided no relief  Pt confused poor historian. Past Medical History/Comorbidities:   Ms. Lanie Sullivan  has a past medical history of Arthritis; Headache(784.0); Hypertension; Ill-defined condition; Ill-defined condition; Psychiatric disorder; and Thyroid disease. Ms. Lanie Sullivan  has a past surgical history that includes cardiac surg procedure unlist; cholecystectomy; and orthopaedic. Social History/Living Environment:   Home Environment: Trailer/mobile home  # Steps to Enter: 5  Rails to Enter: Yes  Hand Rails : Bilateral  One/Two Story Residence: One story  Living Alone: No  Support Systems: Child(patsy) (special needs)  Patient Expects to be Discharged to[de-identified] Unknown  Current DME Used/Available at Home: Shower chair  Tub or Shower Type: Tub/Shower combination  Prior Level of Function/Work/Activity:  Pt reports she lives with her special needs daughter in a mobile home with 5 steps to enter. She reports using a quad cane for ambulation and getting assistance from her daughter for ADLs. Pt has had numerous falls in the past month.       Number of Personal Factors/Comorbidities that affect the Plan of Care:  · Psychiatric disorder  · Little home support  · Multiple falls 3+: HIGH COMPLEXITY   EXAMINATION:   Most Recent Physical Functioning:   Gross Assessment:  AROM: Generally decreased, functional (L hip flexion)  Strength: Generally decreased, functional (bilateral hip flexion)  Sensation: Impaired (B L5-S2)               Posture:     Balance:  Sitting: Impaired  Sitting - Static: Fair (occasional)  Sitting - Dynamic: Fair (occasional)  Standing: Impaired  Standing - Static: Fair  Standing - Dynamic : Poor Bed Mobility:  Rolling: Contact guard assistance  Supine to Sit: Contact guard assistance  Scooting: Minimum assistance  Interventions: Verbal cues; Tactile cues; Visual cues  Wheelchair Mobility:     Transfers:  Sit to Stand: Moderate assistance (with raised bed)  Stand to Sit: Minimum assistance  Bed to Chair: Minimum assistance  Interventions: Safety awareness training;Manual cues; Verbal cues; Visual cues  Gait:     Speed/Grazyna: Slow;Shuffled  Step Length: Left shortened  Gait Abnormalities: Step to gait; Decreased step clearance  Distance (ft): 3 Feet (ft)  Assistive Device: Walker, rolling  Ambulation - Level of Assistance: Contact guard assistance;Minimal assistance       Body Structures Involved:  1. Nerves  2. Metabolic  3. Muscles Body Functions Affected:  1. Sensory/Pain  2. Neuromusculoskeletal  3. Movement Related  4. Metobolic/Endocrine Activities and Participation Affected:  1. General Tasks and Demands  2. Mobility  3. Community, Social and [place]   Number of elements that affect the Plan of Care: 4+: HIGH COMPLEXITY   CLINICAL PRESENTATION:   Presentation: Evolving clinical presentation with changing clinical characteristics: MODERATE COMPLEXITY   CLINICAL DECISION MAKIN [facility] In[facility] Short Form  How much difficulty does the patient currently have. .. Unable A Lot A Little None   1. Turning over in bed (including adjusting bedclothes, sheets and blankets)? [ ] 1   [ ] 2   [X] 3   [ ] 4   2. Sitting down on and standing up from a chair with arms ( e.g., wheelchair, bedside commode, etc.)   [ ] 1   [ ] 2   [X] 3   [ ] 4   3. Moving from lying on back to sitting on the side of the bed? [ ] 1   [ ] 2   [X] 3   [ ] 4   How much help from another person does the patient currently need. .. Total A Lot A Little None   4. Moving to and from a bed to a chair (including a wheelchair)? [ ] 1   [X] 2   [ ] 3   [ ] 4   5. Need to walk in hospital room? [ ] 1   [X] 2   [ ] 3   [ ] 4   6. Climbing 3-5 steps with a railing?    [ ] 1   [X] 2   [ ] 3   [ ] 4   © 2007, [employer] of [place] [facility], under license to [employer]. All rights reserved    Score:  Initial: 15 Most Recent: X (Date: -- )     Interpretation of Tool:  Represents activities that are increasingly more difficult (i.e. Bed mobility, Transfers, Gait). Score 24 23 22-20 19-15 14-10 9-7 6       Modifier CH CI CJ CK CL CM CN         · Mobility - Walking and Moving Around:               - CURRENT STATUS:    CK - 40%-59% impaired, limited or restricted               - GOAL STATUS:           CJ - 20%-39% impaired, limited or restricted               - D/C STATUS:                       CK - 40%-59% impaired, limited or restricted  Payor: CARE IMPROVEMENT PLUS [place] / Product Type: [employer] Medicare /       Medical Necessity:     · Patient demonstrates good rehab potential due to higher previous functional level. Reason for Services/Other Comments:  · Patient continues to require skilled intervention due to balance impairments and decreased functional mobility. Use of outcome tool(s) and clinical judgement create a POC that gives a: Questionable prediction of patient's progress: MODERATE COMPLEXITY                 TREATMENT:      Pre-treatment Symptoms/Complaints:  L rib pain  Pain: Initial:   Pain Intensity 1: 7  Pain Location 1: Rib cage  Pain Orientation 1: Left  Post Session:  7/10      Therapeutic Activity: (    13 minutes): Therapeutic activities including Bed transfers, Chair transfers and Ambulation on level ground to improve mobility, strength, balance and coordination. Required moderate   to promote static and dynamic balance in standing and promote coordination of bilateral, upper extremity(s), lower extremity(s). Therapeutic Exercise: (10 Minutes):  Exercises per grid below to improve mobility and strength. Required minimal visual, verbal and tactile cues to promote proper body alignment, promote proper body posture and promote proper body mechanics.   Progressed repetitions as indicated. Date:  2/21/17 Date:   Date:     Activity/Exercise Parameters Parameters Parameters   Seated LAQ 1 x 20 B     Seated marching 1 x 20 B     Seated AP 1 x 10 B     Seated hip abd/add 1 x 20 B                              Braces/Orthotics/Lines/Etc:   · IV  · O2 Device: Room air  Treatment/Session Assessment:    · Response to Treatment:  Pt did not tolerate treatment very well given as she had increases rib/chest pain with sitting and L LE calf pain. · Interdisciplinary Collaboration:  · Physical Therapist, Registered Nurse and Certified Nursing Assistant/Patient Care Technician  · After treatment position/precautions:  · Up in chair, Bed/Chair-wheels locked, Bed in low position, Call light within reach, RN notified and CNA in room with posey alarm activated  · Compliance with Program/Exercises: Will assess as treatment progresses. · Recommendations/Intent for next treatment session: \"Next visit will focus on advancements to more challenging activities and reduction in assistance provided\".   Total Treatment Duration:  PT Patient Time In/Time Out  Time In: 0925  Time Out: Domenic Gant 25 Ravi PT, DPT

## 2017-02-24 LAB
BACTERIA SPEC CULT: NORMAL
SERVICE CMNT-IMP: NORMAL

## 2018-09-28 ENCOUNTER — APPOINTMENT (OUTPATIENT)
Dept: GENERAL RADIOLOGY | Age: 83
End: 2018-09-28
Attending: EMERGENCY MEDICINE
Payer: MEDICARE

## 2018-09-28 ENCOUNTER — HOSPITAL ENCOUNTER (EMERGENCY)
Age: 83
Discharge: HOME | End: 2018-09-28
Attending: EMERGENCY MEDICINE
Payer: MEDICARE

## 2018-09-28 ENCOUNTER — APPOINTMENT (OUTPATIENT)
Dept: CT IMAGING | Age: 83
End: 2018-09-28
Attending: EMERGENCY MEDICINE
Payer: MEDICARE

## 2018-09-28 VITALS
HEART RATE: 99 BPM | HEIGHT: 62 IN | TEMPERATURE: 98.6 F | RESPIRATION RATE: 18 BRPM | BODY MASS INDEX: 33.86 KG/M2 | DIASTOLIC BLOOD PRESSURE: 69 MMHG | OXYGEN SATURATION: 91 % | WEIGHT: 184 LBS | SYSTOLIC BLOOD PRESSURE: 161 MMHG

## 2018-09-28 DIAGNOSIS — S42.295A OTHER CLOSED NONDISPLACED FRACTURE OF PROXIMAL END OF LEFT HUMERUS, INITIAL ENCOUNTER: Primary | ICD-10-CM

## 2018-09-28 DIAGNOSIS — S00.83XA CONTUSION OF FACE, INITIAL ENCOUNTER: ICD-10-CM

## 2018-09-28 LAB
ANION GAP SERPL CALC-SCNC: 7 MMOL/L (ref 7–16)
ATRIAL RATE: 208 BPM
BASOPHILS # BLD: 0 K/UL (ref 0–0.2)
BASOPHILS NFR BLD: 0 % (ref 0–2)
BUN SERPL-MCNC: 21 MG/DL (ref 8–23)
CALCIUM SERPL-MCNC: 10.4 MG/DL (ref 8.3–10.4)
CALCULATED R AXIS, ECG10: -12 DEGREES
CALCULATED T AXIS, ECG11: 57 DEGREES
CHLORIDE SERPL-SCNC: 113 MMOL/L (ref 98–107)
CO2 SERPL-SCNC: 22 MMOL/L (ref 21–32)
CREAT SERPL-MCNC: 1.42 MG/DL (ref 0.6–1)
DIAGNOSIS, 93000: NORMAL
DIFFERENTIAL METHOD BLD: ABNORMAL
EOSINOPHIL # BLD: 0.1 K/UL (ref 0–0.8)
EOSINOPHIL NFR BLD: 1 % (ref 0.5–7.8)
ERYTHROCYTE [DISTWIDTH] IN BLOOD BY AUTOMATED COUNT: 13.9 %
GLUCOSE SERPL-MCNC: 107 MG/DL (ref 65–100)
HCT VFR BLD AUTO: 36.4 % (ref 35.8–46.3)
HGB BLD-MCNC: 11 G/DL (ref 11.7–15.4)
IMM GRANULOCYTES # BLD: 0.1 K/UL (ref 0–0.5)
IMM GRANULOCYTES NFR BLD AUTO: 1 % (ref 0–5)
LYMPHOCYTES # BLD: 1.7 K/UL (ref 0.5–4.6)
LYMPHOCYTES NFR BLD: 17 % (ref 13–44)
MAGNESIUM SERPL-MCNC: 1.8 MG/DL (ref 1.8–2.4)
MCH RBC QN AUTO: 30.5 PG (ref 26.1–32.9)
MCHC RBC AUTO-ENTMCNC: 30.2 G/DL (ref 31.4–35)
MCV RBC AUTO: 100.8 FL (ref 79.6–97.8)
MONOCYTES # BLD: 0.7 K/UL (ref 0.1–1.3)
MONOCYTES NFR BLD: 7 % (ref 4–12)
NEUTS SEG # BLD: 7.5 K/UL (ref 1.7–8.2)
NEUTS SEG NFR BLD: 74 % (ref 43–78)
NRBC # BLD: 0 K/UL (ref 0–0.2)
PLATELET # BLD AUTO: 147 K/UL (ref 150–450)
PMV BLD AUTO: 10.6 FL (ref 9.4–12.3)
POTASSIUM SERPL-SCNC: 4.6 MMOL/L (ref 3.5–5.1)
Q-T INTERVAL, ECG07: 318 MS
QRS DURATION, ECG06: 96 MS
QTC CALCULATION (BEZET), ECG08: 401 MS
RBC # BLD AUTO: 3.61 M/UL (ref 4.05–5.2)
SODIUM SERPL-SCNC: 142 MMOL/L (ref 136–145)
VENTRICULAR RATE, ECG03: 96 BPM
WBC # BLD AUTO: 10.2 K/UL (ref 4.3–11.1)

## 2018-09-28 PROCEDURE — 80048 BASIC METABOLIC PNL TOTAL CA: CPT

## 2018-09-28 PROCEDURE — 93005 ELECTROCARDIOGRAM TRACING: CPT | Performed by: EMERGENCY MEDICINE

## 2018-09-28 PROCEDURE — 73030 X-RAY EXAM OF SHOULDER: CPT

## 2018-09-28 PROCEDURE — 71045 X-RAY EXAM CHEST 1 VIEW: CPT

## 2018-09-28 PROCEDURE — 85025 COMPLETE CBC W/AUTO DIFF WBC: CPT

## 2018-09-28 PROCEDURE — 70450 CT HEAD/BRAIN W/O DYE: CPT

## 2018-09-28 PROCEDURE — 96374 THER/PROPH/DIAG INJ IV PUSH: CPT | Performed by: EMERGENCY MEDICINE

## 2018-09-28 PROCEDURE — 94640 AIRWAY INHALATION TREATMENT: CPT

## 2018-09-28 PROCEDURE — 74011250636 HC RX REV CODE- 250/636: Performed by: EMERGENCY MEDICINE

## 2018-09-28 PROCEDURE — 99283 EMERGENCY DEPT VISIT LOW MDM: CPT | Performed by: EMERGENCY MEDICINE

## 2018-09-28 PROCEDURE — 83735 ASSAY OF MAGNESIUM: CPT

## 2018-09-28 PROCEDURE — 96375 TX/PRO/DX INJ NEW DRUG ADDON: CPT | Performed by: EMERGENCY MEDICINE

## 2018-09-28 PROCEDURE — 72100 X-RAY EXAM L-S SPINE 2/3 VWS: CPT

## 2018-09-28 PROCEDURE — 74011000250 HC RX REV CODE- 250: Performed by: EMERGENCY MEDICINE

## 2018-09-28 PROCEDURE — 77030013140 HC MSK NEB VYRM -A

## 2018-09-28 RX ORDER — HYDROCODONE BITARTRATE AND ACETAMINOPHEN 5; 325 MG/1; MG/1
1 TABLET ORAL
Qty: 20 TAB | Refills: 0 | Status: SHIPPED | OUTPATIENT
Start: 2018-09-28 | End: 2018-12-10

## 2018-09-28 RX ORDER — SODIUM CHLORIDE 0.9 % (FLUSH) 0.9 %
5-10 SYRINGE (ML) INJECTION AS NEEDED
Status: DISCONTINUED | OUTPATIENT
Start: 2018-09-28 | End: 2018-09-28 | Stop reason: HOSPADM

## 2018-09-28 RX ORDER — MORPHINE SULFATE 2 MG/ML
4 INJECTION, SOLUTION INTRAMUSCULAR; INTRAVENOUS ONCE
Status: COMPLETED | OUTPATIENT
Start: 2018-09-28 | End: 2018-09-28

## 2018-09-28 RX ORDER — IPRATROPIUM BROMIDE AND ALBUTEROL SULFATE 2.5; .5 MG/3ML; MG/3ML
3 SOLUTION RESPIRATORY (INHALATION)
Status: COMPLETED | OUTPATIENT
Start: 2018-09-28 | End: 2018-09-28

## 2018-09-28 RX ORDER — SODIUM CHLORIDE 0.9 % (FLUSH) 0.9 %
5-10 SYRINGE (ML) INJECTION EVERY 8 HOURS
Status: DISCONTINUED | OUTPATIENT
Start: 2018-09-28 | End: 2018-09-28 | Stop reason: HOSPADM

## 2018-09-28 RX ORDER — ONDANSETRON 8 MG/1
8 TABLET, ORALLY DISINTEGRATING ORAL
Qty: 20 TAB | Refills: 0 | Status: SHIPPED | OUTPATIENT
Start: 2018-09-28

## 2018-09-28 RX ADMIN — MORPHINE SULFATE 4 MG: 4 INJECTION, SOLUTION INTRAMUSCULAR; INTRAVENOUS at 09:01

## 2018-09-28 RX ADMIN — IPRATROPIUM BROMIDE AND ALBUTEROL SULFATE 3 ML: .5; 3 SOLUTION RESPIRATORY (INHALATION) at 07:16

## 2018-09-28 RX ADMIN — SODIUM CHLORIDE 12.5 MG: 9 INJECTION INTRAMUSCULAR; INTRAVENOUS; SUBCUTANEOUS at 08:59

## 2018-09-28 NOTE — ED PROVIDER NOTES
HPI Comments: Patient presents to the [facility] from Dr. Sascha Brown at nursing home this morning. She states that she was going into the bathroom and became dizzy and lost her balance and fell. She denies any preceding chest pain palpitations or syncope. .  She has been ill recently with some upper respiratory infection or bronchitis currently being treated with Bactrim. She reports a cough productive of whitish sputum. Secondary to the fall she complains of pain to the left side of the face and most especially to the left shoulder. She also complains of some lower back pain on exam. 
 
Patient is a 80 y.o. female presenting with fall. The history is provided by the patient. Fall The accident occurred 1 to 2 hours ago. The fall occurred while walking. She fell from a height of ground level. She landed on hard floor. The volume of blood lost was minimal. The point of impact was the head and left shoulder. The pain is present in the head and left shoulder (lower back). The pain is at a severity of 8/10. The pain is moderate. She was not ambulatory at the scene. There was no entrapment after the fall. There was no drug use involved in the accident. There was no alcohol use involved in the accident. Associated symptoms include extremity weakness. Pertinent negatives include no visual change, no fever, no numbness, no abdominal pain, no bowel incontinence, no nausea, no vomiting, no hematuria, no headaches, no hearing loss, no loss of consciousness, no tingling and no laceration. The risk factors include being elderly. The symptoms are aggravated by activity, use of injured limb and pressure on injury. She has tried ice for the symptoms. The treatment provided no relief. Past Medical History:  
Diagnosis Date  Arthritis  Headache(784.0)  Hypertension  Ill-defined condition   
 chronic back pain  Ill-defined condition   
 neuropathy  Psychiatric disorder   
 anxiety  Thyroid disease Past Surgical History:  
Procedure Laterality Date  CARDIAC SURG PROCEDURE UNLIST    
 stent  HX CHOLECYSTECTOMY    
 hernia repair  HX ORTHOPAEDIC    
 bilateral knee replacements No family history on file. Social History Social History  Marital status:  Spouse name: N/A  
 Number of children: N/A  
 Years of education: N/A Occupational History  Not on file. Social History Main Topics  Smoking status: Never Smoker  Smokeless tobacco: Never Used  Alcohol use No  
 Drug use: Not on file  Sexual activity: Not on file Other Topics Concern  Not on file Social History Narrative ALLERGIES: Review of patient's allergies indicates no known allergies. Review of Systems Constitutional: Negative for chills and fever. Gastrointestinal: Negative for abdominal pain, bowel incontinence, nausea and vomiting. Genitourinary: Negative for hematuria. Musculoskeletal: Positive for extremity weakness. Neurological: Negative for tingling, loss of consciousness, numbness and headaches. All other systems reviewed and are negative. Vitals:  
 09/28/18 5580 BP: 137/80 Pulse: 95 Resp: 18 Temp: 98.5 °F (36.9 °C) SpO2: 95% Weight: 83.5 kg (184 lb) Height: 5' 2\" (1.575 m) Physical Exam  
Constitutional: She is oriented to person, place, and time. She appears well-developed and well-nourished. No distress. HENT:  
Head: Normocephalic. Right Ear: External ear normal.  
Left Ear: External ear normal.  
Nose: Nose normal.  
Mouth/Throat: Oropharynx is clear and moist. No oropharyngeal exudate. There is a pattern bruise on the left side of the face consistent with the floor tile. TMs are clear  No raccoon or Murray sign is no significant tenderness over the cheeks. Eyes: Conjunctivae and EOM are normal. Pupils are equal, round, and reactive to light. Neck: Normal range of motion. Neck supple. No cervical spine tenderness noted through range of motion against resistance Cardiovascular: Normal rate, regular rhythm and normal heart sounds. Pulmonary/Chest: Effort normal and breath sounds normal. She exhibits no tenderness. Abdominal: Soft. Bowel sounds are normal. There is no tenderness. Musculoskeletal: Normal range of motion. She exhibits edema and tenderness. She exhibits no deformity. Swelling and tenderness is noted to the proximal left humerus no bruising is noted the left upper extremity is neurovascularly intact and compartments are soft. There is tenderness to the lumbar spine as well. No evident injury to the lower extremities or hips. No injury to the right upper extremity Neurological: She is alert and oriented to person, place, and time. Skin: Skin is warm and dry. No laceration noted. Psychiatric: She has a normal mood and affect. Her behavior is normal.  
Nursing note and vitals reviewed. MDM Number of Diagnoses or Management Options Amount and/or Complexity of Data Reviewed Clinical lab tests: ordered and reviewed Tests in the radiology section of CPT®: ordered and reviewed Independent visualization of images, tracings, or specimens: yes Risk of Complications, Morbidity, and/or Mortality Presenting problems: high Diagnostic procedures: moderate Management options: moderate Patient Progress Patient progress: stable ED Course Procedures EKG at 0726: Sinus rhythm rate of 96 nonspecific  ST abnormality no significant change from previous EKG.

## 2018-09-28 NOTE — DISCHARGE INSTRUCTIONS
Broken Arm: Care Instructions  Your Care Instructions  Fractures can range from a small, hairline crack, to a bone or bones broken into two or more pieces. Your treatment depends on how bad the break is. Your doctor may have put your arm in a splint or cast to allow it to heal or to keep it stable until you see another doctor. It may take weeks or months for your arm to heal. You can help your arm heal with some care at home. You heal best when you take good care of yourself. Eat a variety of healthy foods, and don't smoke. You may have had a sedative to help you relax. You may be unsteady after having sedation. It can take a few hours for the medicine's effects to wear off. Common side effects of sedation include nausea, vomiting, and feeling sleepy or tired. The doctor has checked you carefully, but problems can develop later. If you notice any problems or new symptoms, get medical treatment right away. Follow-up care is a key part of your treatment and safety. Be sure to make and go to all appointments, and call your doctor if you are having problems. It's also a good idea to know your test results and keep a list of the medicines you take. How can you care for yourself at home? · If the doctor gave you a sedative:  ¨ For 24 hours, don't do anything that requires attention to detail. It takes time for the medicine's effects to completely wear off. ¨ For your safety, do not drive or operate any machinery that could be dangerous. Wait until the medicine wears off and you can think clearly and react easily. · Put ice or a cold pack on your arm for 10 to 20 minutes at a time. Try to do this every 1 to 2 hours for the next 3 days (when you are awake). Put a thin cloth between the ice and your cast or splint. Keep the cast or splint dry. · Follow the cast care instructions your doctor gives you. If you have a splint, do not take it off unless your doctor tells you to. · Be safe with medicines.  Take pain medicines exactly as directed. ¨ If the doctor gave you a prescription medicine for pain, take it as prescribed. ¨ If you are not taking a prescription pain medicine, ask your doctor if you can take an over-the-counter medicine. · Prop up your arm on pillows when you sit or lie down in the first few days after the injury. Keep the arm higher than the level of your heart. This will help reduce swelling. · Follow instructions for exercises to keep your arm strong. · Wiggle your fingers and wrist often to reduce swelling and stiffness. When should you call for help? Call 911 anytime you think you may need emergency care. For example, call if:    · You are very sleepy and you have trouble waking up.    Call your doctor now or seek immediate medical care if:    · You have new or worse nausea or vomiting.     · You have new or worse pain.     · Your hand or fingers are cool or pale or change color.     · Your cast or splint feels too tight.     · You have tingling, weakness, or numbness in your hand or fingers.    Watch closely for changes in your health, and be sure to contact your doctor if:    · You do not get better as expected.     · You have problems with your cast or splint. Where can you learn more? Go to http://edilia-harjinder.info/. Enter W462 in the search box to learn more about \"Broken Arm: Care Instructions. \"  Current as of: November 29, 2017  Content Version: 11.7  © 9056-9782 [place]. Care instructions adapted under license by [employer] (which disclaims liability or warranty for this information). If you have questions about a medical condition or this instruction, always ask your healthcare professional. [address] any warranty or liability for your use of this information. Head Injury: Care Instructions  Your Care Instructions    Most injuries to the head are minor.  Bumps, cuts, and scrapes on the head and face usually heal well and can be treated the same as injuries to other parts of the body. Although it's rare, once in a while a more serious problem shows up after you are home. So it's good to be on the lookout for symptoms for a day or two. Follow-up care is a key part of your treatment and safety. Be sure to make and go to all appointments, and call your doctor if you are having problems. It's also a good idea to know your test results and keep a list of the medicines you take. How can you care for yourself at home? · Follow your doctor's instructions. He or she will tell you if you need someone to watch you closely for the next 24 hours or longer. · Take it easy for the next few days or more if you are not feeling well. · Ask your doctor when it's okay for you to go back to activities like driving a car, riding a bike, or operating machinery. When should you call for help? Call 911 anytime you think you may need emergency care. For example, call if:    · You have a seizure.     · You passed out (lost consciousness).     · You are confused or can't stay awake.    Call your doctor now or seek immediate medical care if:    · You have new or worse vomiting.     · You feel less alert.     · You have new weakness or numbness in any part of your body.    Watch closely for changes in your health, and be sure to contact your doctor if:    · You do not get better as expected.     · You have new symptoms, such as headaches, trouble concentrating, or changes in mood. Where can you learn more? Go to http://edilia-harjinder.info/. Enter C737 in the search box to learn more about \"Head Injury: Care Instructions. \"  Current as of: October 9, 2017  Content Version: 11.7  © 3605-9066 [employer]. Care instructions adapted under license by [employer] (which disclaims liability or warranty for this information).  If you have questions about a medical condition or this instruction, always ask your healthcare professional. [place] [address] any warranty or liability for your use of this information.

## 2018-09-28 NOTE — ED TRIAGE NOTES
Pt arrived via EMS from [address] [place] [address] and [facility]. EMS states that pt had a witnessed fall in the bathroom and hit the left side of head. Swelling to the left shoulder noted. Pt is having a hard time moving extremity.

## 2018-09-28 NOTE — ED NOTES
I have reviewed discharge instructions with the patient. The patient verbalized understanding. Patient left ED via Discharge Method: stretcher to Rehab facility with G[facility] transport Opportunity for questions and clarification provided. Patient given 0 scripts. To continue your aftercare when you leave the hospital, you may receive an automated call from our care team to check in on how you are doing. This is a free service and part of our promise to provide the best care and service to meet your aftercare needs.  If you have questions, or wish to unsubscribe from this service please call [phone]. Thank you for Choosing our [facility] Emergency Department.

## 2018-09-28 NOTE — PROGRESS NOTES
Patient having breathing treatment and IV started at this time. Will try to get X-ray as soon as she is finished.

## 2018-12-10 ENCOUNTER — HOSPITAL ENCOUNTER (INPATIENT)
Age: 83
LOS: 7 days | Discharge: NURSING, REHAB OR LONG-TERM CARE | DRG: 871 | End: 2018-12-17
Attending: EMERGENCY MEDICINE | Admitting: INTERNAL MEDICINE
Payer: MEDICARE

## 2018-12-10 ENCOUNTER — APPOINTMENT (OUTPATIENT)
Dept: GENERAL RADIOLOGY | Age: 83
DRG: 871 | End: 2018-12-10
Attending: EMERGENCY MEDICINE
Payer: MEDICARE

## 2018-12-10 ENCOUNTER — APPOINTMENT (OUTPATIENT)
Dept: CT IMAGING | Age: 83
DRG: 871 | End: 2018-12-10
Attending: EMERGENCY MEDICINE
Payer: MEDICARE

## 2018-12-10 DIAGNOSIS — R50.9 FEVER, UNKNOWN ORIGIN: Primary | ICD-10-CM

## 2018-12-10 DIAGNOSIS — A41.9 SEPSIS, DUE TO UNSPECIFIED ORGANISM: ICD-10-CM

## 2018-12-10 PROBLEM — J18.9 HCAP (HEALTHCARE-ASSOCIATED PNEUMONIA): Status: ACTIVE | Noted: 2018-12-10

## 2018-12-10 PROBLEM — R19.7 DIARRHEA OF PRESUMED INFECTIOUS ORIGIN: Status: ACTIVE | Noted: 2018-12-10

## 2018-12-10 PROBLEM — R11.2 NON-INTRACTABLE VOMITING WITH NAUSEA: Status: ACTIVE | Noted: 2018-12-10

## 2018-12-10 LAB
ALBUMIN SERPL-MCNC: 2.3 G/DL (ref 3.2–4.6)
ALBUMIN/GLOB SERPL: 0.4 {RATIO} (ref 1.2–3.5)
ALP SERPL-CCNC: 169 U/L (ref 50–136)
ALT SERPL-CCNC: 31 U/L (ref 12–65)
ANION GAP SERPL CALC-SCNC: 7 MMOL/L (ref 7–16)
APPEARANCE UR: ABNORMAL
AST SERPL-CCNC: 44 U/L (ref 15–37)
ATRIAL RATE: 125 BPM
BACTERIA URNS QL MICRO: ABNORMAL /HPF
BASOPHILS # BLD: 0 K/UL (ref 0–0.2)
BASOPHILS NFR BLD: 0 % (ref 0–2)
BILIRUB SERPL-MCNC: 0.8 MG/DL (ref 0.2–1.1)
BILIRUB UR QL: NEGATIVE
BUN SERPL-MCNC: 18 MG/DL (ref 8–23)
CALCIUM SERPL-MCNC: 10.8 MG/DL (ref 8.3–10.4)
CALCULATED P AXIS, ECG09: 62 DEGREES
CALCULATED R AXIS, ECG10: -6 DEGREES
CALCULATED T AXIS, ECG11: 76 DEGREES
CASTS URNS QL MICRO: 0 /LPF
CHLORIDE SERPL-SCNC: 106 MMOL/L (ref 98–107)
CO2 SERPL-SCNC: 25 MMOL/L (ref 21–32)
COLOR UR: YELLOW
CREAT SERPL-MCNC: 1.39 MG/DL (ref 0.6–1)
DIAGNOSIS, 93000: NORMAL
DIFFERENTIAL METHOD BLD: ABNORMAL
EOSINOPHIL # BLD: 0 K/UL (ref 0–0.8)
EOSINOPHIL NFR BLD: 0 % (ref 0.5–7.8)
EPI CELLS #/AREA URNS HPF: ABNORMAL /HPF
ERYTHROCYTE [DISTWIDTH] IN BLOOD BY AUTOMATED COUNT: 14.5 % (ref 11.9–14.6)
FLUAV AG NPH QL IA: NEGATIVE
FLUBV AG NPH QL IA: NEGATIVE
GLOBULIN SER CALC-MCNC: 6.5 G/DL (ref 2.3–3.5)
GLUCOSE SERPL-MCNC: 152 MG/DL (ref 65–100)
GLUCOSE UR STRIP.AUTO-MCNC: NEGATIVE MG/DL
HCT VFR BLD AUTO: 36.8 % (ref 35.8–46.3)
HGB BLD-MCNC: 11.2 G/DL (ref 11.7–15.4)
HGB UR QL STRIP: ABNORMAL
IMM GRANULOCYTES # BLD: 0.2 K/UL (ref 0–0.5)
IMM GRANULOCYTES NFR BLD AUTO: 1 % (ref 0–5)
KETONES UR QL STRIP.AUTO: NEGATIVE MG/DL
LACTATE BLD-SCNC: 1.28 MMOL/L (ref 0.5–1.9)
LACTATE BLD-SCNC: 2.75 MMOL/L (ref 0.5–1.9)
LEUKOCYTE ESTERASE UR QL STRIP.AUTO: NEGATIVE
LYMPHOCYTES # BLD: 1.7 K/UL (ref 0.5–4.6)
LYMPHOCYTES NFR BLD: 12 % (ref 13–44)
MCH RBC QN AUTO: 30.2 PG (ref 26.1–32.9)
MCHC RBC AUTO-ENTMCNC: 30.4 G/DL (ref 31.4–35)
MCV RBC AUTO: 99.2 FL (ref 79.6–97.8)
MONOCYTES # BLD: 1.1 K/UL (ref 0.1–1.3)
MONOCYTES NFR BLD: 8 % (ref 4–12)
NEUTS SEG # BLD: 11.3 K/UL (ref 1.7–8.2)
NEUTS SEG NFR BLD: 79 % (ref 43–78)
NITRITE UR QL STRIP.AUTO: POSITIVE
NRBC # BLD: 0 K/UL (ref 0–0.2)
P-R INTERVAL, ECG05: 160 MS
PH UR STRIP: 6 [PH] (ref 5–9)
PLATELET # BLD AUTO: 168 K/UL (ref 150–450)
PMV BLD AUTO: 11.3 FL (ref 9.4–12.3)
POTASSIUM SERPL-SCNC: 4.6 MMOL/L (ref 3.5–5.1)
PROCALCITONIN SERPL-MCNC: 1.2 NG/ML
PROT SERPL-MCNC: 8.8 G/DL (ref 6.3–8.2)
PROT UR STRIP-MCNC: 100 MG/DL
Q-T INTERVAL, ECG07: 300 MS
QRS DURATION, ECG06: 96 MS
QTC CALCULATION (BEZET), ECG08: 433 MS
RBC # BLD AUTO: 3.71 M/UL (ref 4.05–5.2)
RBC #/AREA URNS HPF: ABNORMAL /HPF
SODIUM SERPL-SCNC: 138 MMOL/L (ref 136–145)
SP GR UR REFRACTOMETRY: 1.02 (ref 1–1.02)
SPECIMEN SOURCE: NORMAL
UROBILINOGEN UR QL STRIP.AUTO: 0.2 EU/DL (ref 0.2–1)
VENTRICULAR RATE, ECG03: 125 BPM
WBC # BLD AUTO: 14.3 K/UL (ref 4.3–11.1)
WBC URNS QL MICRO: ABNORMAL /HPF

## 2018-12-10 PROCEDURE — 74011636320 HC RX REV CODE- 636/320: Performed by: EMERGENCY MEDICINE

## 2018-12-10 PROCEDURE — 93005 ELECTROCARDIOGRAM TRACING: CPT | Performed by: EMERGENCY MEDICINE

## 2018-12-10 PROCEDURE — 74011000258 HC RX REV CODE- 258: Performed by: INTERNAL MEDICINE

## 2018-12-10 PROCEDURE — 81001 URINALYSIS AUTO W/SCOPE: CPT

## 2018-12-10 PROCEDURE — 87086 URINE CULTURE/COLONY COUNT: CPT

## 2018-12-10 PROCEDURE — 84145 PROCALCITONIN (PCT): CPT

## 2018-12-10 PROCEDURE — 87804 INFLUENZA ASSAY W/OPTIC: CPT

## 2018-12-10 PROCEDURE — 74011250637 HC RX REV CODE- 250/637: Performed by: EMERGENCY MEDICINE

## 2018-12-10 PROCEDURE — 96361 HYDRATE IV INFUSION ADD-ON: CPT | Performed by: EMERGENCY MEDICINE

## 2018-12-10 PROCEDURE — 83605 ASSAY OF LACTIC ACID: CPT

## 2018-12-10 PROCEDURE — 87040 BLOOD CULTURE FOR BACTERIA: CPT

## 2018-12-10 PROCEDURE — 74011250637 HC RX REV CODE- 250/637: Performed by: INTERNAL MEDICINE

## 2018-12-10 PROCEDURE — 99285 EMERGENCY DEPT VISIT HI MDM: CPT | Performed by: EMERGENCY MEDICINE

## 2018-12-10 PROCEDURE — 74177 CT ABD & PELVIS W/CONTRAST: CPT

## 2018-12-10 PROCEDURE — 74011250636 HC RX REV CODE- 250/636: Performed by: EMERGENCY MEDICINE

## 2018-12-10 PROCEDURE — 80053 COMPREHEN METABOLIC PANEL: CPT

## 2018-12-10 PROCEDURE — 74011000258 HC RX REV CODE- 258: Performed by: EMERGENCY MEDICINE

## 2018-12-10 PROCEDURE — 96360 HYDRATION IV INFUSION INIT: CPT | Performed by: EMERGENCY MEDICINE

## 2018-12-10 PROCEDURE — 85025 COMPLETE CBC W/AUTO DIFF WBC: CPT

## 2018-12-10 PROCEDURE — 71045 X-RAY EXAM CHEST 1 VIEW: CPT

## 2018-12-10 PROCEDURE — 65270000029 HC RM PRIVATE

## 2018-12-10 PROCEDURE — 77030020263 HC SOL INJ SOD CL0.9% LFCR 1000ML

## 2018-12-10 PROCEDURE — 74011250636 HC RX REV CODE- 250/636: Performed by: INTERNAL MEDICINE

## 2018-12-10 RX ORDER — ALBUTEROL SULFATE 0.83 MG/ML
2.5 SOLUTION RESPIRATORY (INHALATION)
Status: DISCONTINUED | OUTPATIENT
Start: 2018-12-10 | End: 2018-12-17 | Stop reason: HOSPADM

## 2018-12-10 RX ORDER — LANOLIN ALCOHOL/MO/W.PET/CERES
1000 CREAM (GRAM) TOPICAL DAILY
Status: DISCONTINUED | OUTPATIENT
Start: 2018-12-11 | End: 2018-12-17 | Stop reason: HOSPADM

## 2018-12-10 RX ORDER — LEVOTHYROXINE SODIUM 75 UG/1
75 TABLET ORAL
Status: DISCONTINUED | OUTPATIENT
Start: 2018-12-11 | End: 2018-12-17 | Stop reason: HOSPADM

## 2018-12-10 RX ORDER — SODIUM CHLORIDE 9 MG/ML
75 INJECTION, SOLUTION INTRAVENOUS CONTINUOUS
Status: DISPENSED | OUTPATIENT
Start: 2018-12-10 | End: 2018-12-11

## 2018-12-10 RX ORDER — ASPIRIN 81 MG/1
81 TABLET ORAL DAILY
Status: DISCONTINUED | OUTPATIENT
Start: 2018-12-11 | End: 2018-12-17 | Stop reason: HOSPADM

## 2018-12-10 RX ORDER — SODIUM CHLORIDE 0.9 % (FLUSH) 0.9 %
5-10 SYRINGE (ML) INJECTION AS NEEDED
Status: DISCONTINUED | OUTPATIENT
Start: 2018-12-10 | End: 2018-12-11 | Stop reason: SDUPTHER

## 2018-12-10 RX ORDER — FLUTICASONE PROPIONATE 50 MCG
1 SPRAY, SUSPENSION (ML) NASAL
COMMUNITY

## 2018-12-10 RX ORDER — ENOXAPARIN SODIUM 100 MG/ML
30 INJECTION SUBCUTANEOUS EVERY 24 HOURS
Status: DISCONTINUED | OUTPATIENT
Start: 2018-12-10 | End: 2018-12-11 | Stop reason: DRUGHIGH

## 2018-12-10 RX ORDER — ACETAMINOPHEN 325 MG/1
650 TABLET ORAL
Status: COMPLETED | OUTPATIENT
Start: 2018-12-10 | End: 2018-12-10

## 2018-12-10 RX ORDER — SODIUM CHLORIDE 0.9 % (FLUSH) 0.9 %
10 SYRINGE (ML) INJECTION
Status: COMPLETED | OUTPATIENT
Start: 2018-12-10 | End: 2018-12-10

## 2018-12-10 RX ORDER — ACETAMINOPHEN 500 MG
1000 TABLET ORAL 3 TIMES DAILY
Status: DISCONTINUED | OUTPATIENT
Start: 2018-12-10 | End: 2018-12-17 | Stop reason: HOSPADM

## 2018-12-10 RX ORDER — LORATADINE 10 MG/1
10 TABLET ORAL DAILY
Status: DISCONTINUED | OUTPATIENT
Start: 2018-12-11 | End: 2018-12-17 | Stop reason: HOSPADM

## 2018-12-10 RX ORDER — SODIUM CHLORIDE 0.9 % (FLUSH) 0.9 %
5-10 SYRINGE (ML) INJECTION EVERY 8 HOURS
Status: DISCONTINUED | OUTPATIENT
Start: 2018-12-10 | End: 2018-12-17 | Stop reason: HOSPADM

## 2018-12-10 RX ORDER — LANOLIN ALCOHOL/MO/W.PET/CERES
1000 CREAM (GRAM) TOPICAL DAILY
COMMUNITY

## 2018-12-10 RX ORDER — VANCOMYCIN 2 GRAM/500 ML IN 0.9 % SODIUM CHLORIDE INTRAVENOUS
2000 ONCE
Status: COMPLETED | OUTPATIENT
Start: 2018-12-10 | End: 2018-12-10

## 2018-12-10 RX ORDER — ONDANSETRON 2 MG/ML
4 INJECTION INTRAMUSCULAR; INTRAVENOUS
Status: DISCONTINUED | OUTPATIENT
Start: 2018-12-10 | End: 2018-12-17 | Stop reason: HOSPADM

## 2018-12-10 RX ORDER — GABAPENTIN 300 MG/1
300 CAPSULE ORAL 3 TIMES DAILY
Status: DISCONTINUED | OUTPATIENT
Start: 2018-12-10 | End: 2018-12-17 | Stop reason: HOSPADM

## 2018-12-10 RX ORDER — LORATADINE 10 MG/1
10 TABLET ORAL DAILY
COMMUNITY

## 2018-12-10 RX ORDER — GABAPENTIN 300 MG/1
300 CAPSULE ORAL 3 TIMES DAILY
COMMUNITY

## 2018-12-10 RX ORDER — SODIUM CHLORIDE 0.9 % (FLUSH) 0.9 %
5-10 SYRINGE (ML) INJECTION AS NEEDED
Status: DISCONTINUED | OUTPATIENT
Start: 2018-12-10 | End: 2018-12-17 | Stop reason: HOSPADM

## 2018-12-10 RX ORDER — DOCUSATE SODIUM 100 MG/1
100 CAPSULE, LIQUID FILLED ORAL 2 TIMES DAILY
COMMUNITY

## 2018-12-10 RX ORDER — ACETAMINOPHEN 500 MG
1000 TABLET ORAL 3 TIMES DAILY
COMMUNITY

## 2018-12-10 RX ORDER — SENNOSIDES 8.6 MG/1
2 TABLET ORAL DAILY
COMMUNITY

## 2018-12-10 RX ORDER — FLUTICASONE PROPIONATE 50 MCG
1 SPRAY, SUSPENSION (ML) NASAL
Status: DISCONTINUED | OUTPATIENT
Start: 2018-12-10 | End: 2018-12-17 | Stop reason: HOSPADM

## 2018-12-10 RX ORDER — ASPIRIN 81 MG/1
81 TABLET ORAL DAILY
COMMUNITY

## 2018-12-10 RX ADMIN — VANCOMYCIN HYDROCHLORIDE 2000 MG: 10 INJECTION, POWDER, LYOPHILIZED, FOR SOLUTION INTRAVENOUS at 18:19

## 2018-12-10 RX ADMIN — GABAPENTIN 300 MG: 300 CAPSULE ORAL at 23:28

## 2018-12-10 RX ADMIN — SODIUM CHLORIDE 1000 ML: 900 INJECTION, SOLUTION INTRAVENOUS at 13:02

## 2018-12-10 RX ADMIN — SODIUM CHLORIDE 100 ML: 900 INJECTION, SOLUTION INTRAVENOUS at 16:11

## 2018-12-10 RX ADMIN — ENOXAPARIN SODIUM 30 MG: 30 INJECTION, SOLUTION INTRAVENOUS; SUBCUTANEOUS at 20:53

## 2018-12-10 RX ADMIN — SODIUM CHLORIDE 75 ML/HR: 900 INJECTION, SOLUTION INTRAVENOUS at 18:18

## 2018-12-10 RX ADMIN — ACETAMINOPHEN 650 MG: 325 TABLET, FILM COATED ORAL at 13:02

## 2018-12-10 RX ADMIN — IOPAMIDOL 100 ML: 755 INJECTION, SOLUTION INTRAVENOUS at 16:10

## 2018-12-10 RX ADMIN — SODIUM CHLORIDE 1000 ML: 900 INJECTION, SOLUTION INTRAVENOUS at 13:56

## 2018-12-10 RX ADMIN — PIPERACILLIN SODIUM,TAZOBACTAM SODIUM 4.5 G: 4; .5 INJECTION, POWDER, FOR SOLUTION INTRAVENOUS at 20:44

## 2018-12-10 RX ADMIN — ACETAMINOPHEN 1000 MG: 500 TABLET, FILM COATED ORAL at 23:17

## 2018-12-10 RX ADMIN — Medication 10 ML: at 16:11

## 2018-12-10 RX ADMIN — FLUTICASONE PROPIONATE 1 SPRAY: 50 SPRAY, METERED NASAL at 23:17

## 2018-12-10 RX ADMIN — Medication 10 ML: at 23:29

## 2018-12-10 RX ADMIN — Medication 1 AMPULE: at 20:53

## 2018-12-10 NOTE — ED NOTES
TRANSFER - OUT REPORT: 
 
Verbal report given to Amita on Marta Vieira  being transferred to [facility] for routine progression of care Report consisted of patients Situation, Background, Assessment and  
Recommendations(SBAR). Information from the following report(s) SBAR, MAR and Recent Results was reviewed with the receiving nurse. Lines:  
Peripheral IV Right Antecubital (Active) Site Assessment Clean, dry, & intact 12/10/2018  5:13 PM  
Phlebitis Assessment 0 12/10/2018  5:13 PM  
Infiltration Assessment 0 12/10/2018  5:13 PM  
Dressing Status Clean, dry, & intact 12/10/2018  5:13 PM  
Dressing Type Transparent 12/10/2018  5:13 PM  
   
Peripheral IV Left Hand (Active) Site Assessment Clean, dry, & intact 12/10/2018  5:13 PM  
Phlebitis Assessment 0 12/10/2018  5:13 PM  
Infiltration Assessment 0 12/10/2018  5:13 PM  
Dressing Status Clean, dry, & intact 12/10/2018  5:13 PM  
Dressing Type Transparent 12/10/2018  5:13 PM  
  
 
Opportunity for questions and clarification was provided.

## 2018-12-10 NOTE — PROGRESS NOTES
Pharmacokinetic Consult to Pharmacist 
 
Suma Candy is a 80 y.o. female being treated for HAP with vancomycin and zosyn. Height: 5' 2\" (157.5 cm)  Weight: 83.5 kg (184 lb) Lab Results Component Value Date/Time BUN 18 12/10/2018 12:24 PM  
 Creatinine 1.39 (H) 12/10/2018 12:24 PM  
 WBC 14.3 (H) 12/10/2018 12:24 PM  
 Procalcitonin 1.2 12/10/2018 12:24 PM  
 Lactic Acid (POC) 1.28 12/10/2018 03:26 PM  
  
Estimated Creatinine Clearance: 28 mL/min (A) (based on SCr of 1.39 mg/dL (H)). Day 1 of vancomycin. Goal trough is 15-20. Dosing started with 2g x 1 and then 1g q24h. Will continue to follow patient. Thank you, Jonathan Rosado, Pharm. D. Clinical Pharmacist 
[phone]

## 2018-12-10 NOTE — H&P
HOSPITALIST H&PNAME:  Marta Vieira Age:  80 y.o. 
:   1930 MRN:   [id number] PCP: Anselmo Crawford MD 
Consulting MD: Treatment Team: Attending Provider: Tuan Elizabeth MD 
HPI:  
Guy Amaya is an 81 yo F long-term resident at [facility] with history of debility, recent L humeral neck fracture on , hypothyroidism, who presents to the ED with fever. She reports she started feeling poorly about 4 days ago and developed a dry cough. Then, she developed nausea (few episodes of vomiting) and diarrhea the next day. She has had some slight abdominal discomfort. She reports mild shortness of breath now. She denies any chest pain. Today, she was noted to have a fever of 103 and coughing more with wheezing (according to paperwork from SNF). She was brought to the ED via EMS and given zosyn en route. In ED: WBC 14.3, lactic acid 2.75-->1.28 with IVF bolus, UA with trace bacteria and leuks, tachy with heart rate 125 (sinus tach on EKG), influenza negative. CT abdomen/pelvis showed no significant intra-abdominal pathology, but did show bibasilar airspace disease. Hospitalist service asked to admit for sepsis. Complete ROS done and is as stated in HPI or otherwise negative Past Medical History:  
Diagnosis Date  Arthritis  Headache(784.0)  Hypertension  Ill-defined condition   
 chronic back pain  Ill-defined condition   
 neuropathy  Psychiatric disorder   
 anxiety  Thyroid disease Past Surgical History:  
Procedure Laterality Date  CARDIAC SURG PROCEDURE UNLIST    
 stent  HX CHOLECYSTECTOMY    
 hernia repair  HX ORTHOPAEDIC    
 bilateral knee replacements Prior to Admission Medications Prescriptions Last Dose Informant Patient Reported? Taking?  
acetaminophen (TYLENOL) 500 mg tablet   Yes Yes Sig: Take 1,000 mg by mouth three (3) times daily. aspirin delayed-release 81 mg tablet   Yes Yes Sig: Take 81 mg by mouth daily. cyanocobalamin (VITAMIN B-12) 1,000 mcg tablet   Yes Yes Sig: Take 1,000 mcg by mouth daily. docusate sodium (COLACE) 100 mg capsule   Yes Yes Sig: Take 100 mg by mouth two (2) times a day. fluticasone (FLONASE ALLERGY RELIEF) 50 mcg/actuation nasal spray   Yes Yes Si Thurston by Both Nostrils route nightly.  
gabapentin (NEURONTIN) 300 mg capsule   Yes Yes Sig: Take 300 mg by mouth three (3) times daily. levothyroxine (SYNTHROID) 75 mcg tablet   No Yes Sig: Take 1 Tab by mouth Daily (before breakfast). loratadine (CLARITIN) 10 mg tablet   Yes Yes Sig: Take 10 mg by mouth daily. ondansetron (ZOFRAN ODT) 8 mg disintegrating tablet   No Yes Sig: Take 1 Tab by mouth every eight (8) hours as needed for Nausea. senna (SENNA) 8.6 mg tablet   Yes Yes Sig: Take 2 Tabs by mouth daily. Facility-Administered Medications: None No Known Allergies Social History Tobacco Use  Smoking status: Never Smoker  Smokeless tobacco: Never Used Substance Use Topics  Alcohol use: No  
  
No family history on file. Objective:  
 
Visit Vitals /62 Pulse (!) 102 Temp (!) 101 °F (38.3 °C) Resp 20 Ht 5' 2\" (1.575 m) Wt 83.5 kg (184 lb) SpO2 93% BMI 33.65 kg/m² Temp (24hrs), Av °F (38.3 °C), Min:101 °F (38.3 °C), Max:101 °F (38.3 °C) Patient Vitals for the past 24 hrs: 
 Temp Pulse Resp BP SpO2  
12/10/18 1520  (!) 102  143/62 93 % 12/10/18 1501  (!) 101  132/60 92 % 12/10/18 1441  (!) 103  138/61 93 % 12/10/18 1421  (!) 108  150/62 93 % 12/10/18 1401  (!) 109  140/61 93 % 12/10/18 1232 (!) 101 °F (38.3 °C) (!) 125 20 149/64 92 % Oxygen Therapy O2 Sat (%): 93 % (12/10/18 1520) Pulse via Oximetry: 103 beats per minute (12/10/18 1520) O2 Device: Room air (12/10/18 1232) Physical Exam: 
General:    Alert, cooperative, no distress, obese. Head:   Normocephalic, without obvious abnormality, atraumatic. Nose:  Nares normal. No drainage or sinus tenderness. Lungs:   Bibasilar rales, slight expiratory wheeze bilaterally Heart:   Slightly tachycardic (heart rate ~100), no murmur Abdomen:   Slight lower abdominal tenderness, not distended, + BS Extremities: No cyanosis. No edema. No clubbing Skin:     Texture, turgor normal. No rashes or lesions. Not Jaundiced Neurologic: Alert and oriented x 3, no focal deficits Data Review:  
Recent Results (from the past 24 hour(s)) EKG, 12 LEAD, INITIAL Collection Time: 12/10/18 12:23 PM  
Result Value Ref Range Ventricular Rate 125 BPM  
 Atrial Rate 125 BPM  
 P-R Interval 160 ms QRS Duration 96 ms  
 Q-T Interval 300 ms QTC Calculation (Bezet) 433 ms Calculated P Axis 62 degrees Calculated R Axis -6 degrees Calculated T Axis 76 degrees Diagnosis    
  !! AGE AND GENDER SPECIFIC ECG ANALYSIS !! Sinus tachycardia Left ventricular hypertrophy with repolarization abnormality Abnormal ECG When compared with ECG of 28-SEP-2018 07:26, 
Minimal criteria for Septal infarct are now Present ST no longer depressed in Inferior leads Confirmed by Mercedes Collins MD (), LUCY DE LA FUENTE ([id number]) on 12/10/2018 1:45:37 PM 
  
PROCALCITONIN Collection Time: 12/10/18 12:24 PM  
Result Value Ref Range Procalcitonin 1.2 ng/mL METABOLIC PANEL, COMPREHENSIVE Collection Time: 12/10/18 12:24 PM  
Result Value Ref Range Sodium 138 136 - 145 mmol/L Potassium 4.6 3.5 - 5.1 mmol/L Chloride 106 98 - 107 mmol/L  
 CO2 25 21 - 32 mmol/L Anion gap 7 7 - 16 mmol/L Glucose 152 (H) 65 - 100 mg/dL BUN 18 8 - 23 MG/DL Creatinine 1.39 (H) 0.6 - 1.0 MG/DL  
 GFR est AA 46 (L) >60 ml/min/1.73m2 GFR est non-AA 38 (L) >60 ml/min/1.73m2 Calcium 10.8 (H) 8.3 - 10.4 MG/DL  Bilirubin, total 0.8 0.2 - 1.1 MG/DL  
 ALT (SGPT) 31 12 - 65 U/L  
 AST (SGOT) 44 (H) 15 - 37 U/L  
 Alk. phosphatase 169 (H) 50 - 136 U/L Protein, total 8.8 (H) 6.3 - 8.2 g/dL Albumin 2.3 (L) 3.2 - 4.6 g/dL Globulin 6.5 (H) 2.3 - 3.5 g/dL A-G Ratio 0.4 (L) 1.2 - 3.5    
CBC WITH AUTOMATED DIFF Collection Time: 12/10/18 12:24 PM  
Result Value Ref Range WBC 14.3 (H) 4.3 - 11.1 K/uL  
 RBC 3.71 (L) 4.05 - 5.2 M/uL  
 HGB 11.2 (L) 11.7 - 15.4 g/dL HCT 36.8 35.8 - 46.3 % MCV 99.2 (H) 79.6 - 97.8 FL  
 MCH 30.2 26.1 - 32.9 PG  
 MCHC 30.4 (L) 31.4 - 35.0 g/dL  
 RDW 14.5 11.9 - 14.6 % PLATELET 146 403 - 980 K/uL MPV 11.3 9.4 - 12.3 FL ABSOLUTE NRBC 0.00 0.0 - 0.2 K/uL  
 DF AUTOMATED NEUTROPHILS 79 (H) 43 - 78 % LYMPHOCYTES 12 (L) 13 - 44 % MONOCYTES 8 4.0 - 12.0 % EOSINOPHILS 0 (L) 0.5 - 7.8 % BASOPHILS 0 0.0 - 2.0 % IMMATURE GRANULOCYTES 1 0.0 - 5.0 %  
 ABS. NEUTROPHILS 11.3 (H) 1.7 - 8.2 K/UL  
 ABS. LYMPHOCYTES 1.7 0.5 - 4.6 K/UL  
 ABS. MONOCYTES 1.1 0.1 - 1.3 K/UL  
 ABS. EOSINOPHILS 0.0 0.0 - 0.8 K/UL  
 ABS. BASOPHILS 0.0 0.0 - 0.2 K/UL  
 ABS. IMM. GRANS. 0.2 0.0 - 0.5 K/UL POC LACTIC ACID Collection Time: 12/10/18 12:32 PM  
Result Value Ref Range Lactic Acid (POC) 2.75 (H) 0.5 - 1.9 mmol/L  
URINALYSIS W/ RFLX MICROSCOPIC Collection Time: 12/10/18  1:28 PM  
Result Value Ref Range Color YELLOW Appearance CLOUDY Specific gravity 1.017 1.001 - 1.023    
 pH (UA) 6.0 5.0 - 9.0 Protein 100 (A) NEG mg/dL Glucose NEGATIVE  mg/dL Ketone NEGATIVE  NEG mg/dL Bilirubin NEGATIVE  NEG Blood LARGE (A) NEG Urobilinogen 0.2 0.2 - 1.0 EU/dL Nitrites POSITIVE (A) NEG Leukocyte Esterase NEGATIVE  NEG    
 WBC 0-3 0 /hpf  
 RBC 20-50 0 /hpf Epithelial cells 0-3 0 /hpf Bacteria TRACE 0 /hpf Casts 0 0 /lpf INFLUENZA A & B AG (RAPID TEST) Collection Time: 12/10/18  1:29 PM  
Result Value Ref Range Influenza A Ag NEGATIVE  NEG Influenza B Ag NEGATIVE  NEG  Source NASOPHARYNGEAL    
 POC LACTIC ACID Collection Time: 12/10/18  3:26 PM  
Result Value Ref Range Lactic Acid (POC) 1.28 0.5 - 1.9 mmol/L Imaging Edith Earl Shines CT ABD PELV W CONT Final Result IMPRESSION: Possible infectious process in the lung bases. Decrease sensitivity  
as described above. There are diverticula along the colon but no evidence of  
active diverticulitis seen. XR CHEST PORT Final Result IMPRESSION: Lungs underinflated but cardiac silhouette still felt to be  
enlarged. If a bony injury is suspected dedicated imaging should be considered. Assessment and Plan: Active Hospital Problems Diagnosis Date Noted  HCAP (healthcare-associated pneumonia) 12/10/2018 Priority: 1 - One  Sepsis ([address] 12/10/2018  Non-intractable vomiting with nausea 12/10/2018  Diarrhea of presumed infectious origin 12/10/2018  CKD (chronic kidney disease) stage 3, GFR 30-59 ml/min ([facility]) 02/19/2017 PLAN 
·  Admit to medical bed · Sepsis from suspected HCAP (due to residing in SNF)-  
· Zosyn and vancomycin- de-escalate pending culture results and clinical improvement. · Follow blood cultures, attempt to obtain sputum culture if cough becomes productive. · NS 75 ml/hr x 24 hours. · Albuterol prn. · Supplemental oxygen prn. · Nausea/vomiting/diarrhea · Check stool culture and C diff. · Hold Senna and colace; resume when diarrhea resolves. · PRN zofran. · Hypothyroidism- continue levothyroxine. · Debility- OOB to chair if tolerates (normally in wheelchair). Case management consult for bed hold at [facility]. Surrogate decision maker- Craig Liz (daughter)- patient does not have her phone number Code Status: DNR Anticipated discharge: 3 days pending clinical course Signed By: Kevin Baptiste MD   
 December 10, 2018

## 2018-12-10 NOTE — PROGRESS NOTES
.. TRANSFER - IN REPORT: 
 
Verbal report received from can Vieira  being received from Report consisted of patients Situation, Background, Assessment and  
Recommendations(SBAR). Information from the following report was reviewed with the receiving nurse. Opportunity for questions and clarification was provided. Assessment completed upon patients arrival to unit and care assumed.

## 2018-12-10 NOTE — PROGRESS NOTES
12/10/18 1820 Dual Skin Pressure Injury Assessment Second Care Provider (Based on [address] [place] [address]) Sandhya Ribera RN Skin assessment completed with Low Menjivar No open wounds or abnormalities noted. Pt red and flushed  In her face.

## 2018-12-10 NOTE — ED PROVIDER NOTES
80-year-old white female residing in [facility] sent for evaluation of fever. Patient reported not feeling well this morning and had vital signs checked and was found to be febrile around 103. EMS confirmed a fever and treated her with Zosyn. She has had nausea vomiting diarrhea and abdominal pain. Per EMS this is been present for the past 3 days however patient states that it been present for the past 2 months. Patient complained of some shortness of breath in route to the hospital but this resolved with supplemental oxygen. No other complaints at this time. The history is provided by the patient and the EMS personnel. Past Medical History:  
Diagnosis Date  Arthritis  Headache(784.0)  Hypertension  Ill-defined condition   
 chronic back pain  Ill-defined condition   
 neuropathy  Psychiatric disorder   
 anxiety  Thyroid disease Past Surgical History:  
Procedure Laterality Date  CARDIAC SURG PROCEDURE UNLIST    
 stent  HX CHOLECYSTECTOMY    
 hernia repair  HX ORTHOPAEDIC    
 bilateral knee replacements No family history on file. Social History Socioeconomic History  Marital status:  Spouse name: Not on file  Number of children: Not on file  Years of education: Not on file  Highest education level: Not on file Social Needs  Financial resource strain: Not on file  Food insecurity - worry: Not on file  Food insecurity - inability: Not on file  Transportation needs - medical: Not on file  Transportation needs - non-medical: Not on file Occupational History  Not on file Tobacco Use  Smoking status: Never Smoker  Smokeless tobacco: Never Used Substance and Sexual Activity  Alcohol use: No  
 Drug use: Not on file  Sexual activity: Not on file Other Topics Concern  Not on file Social History Narrative  Not on file ALLERGIES: Patient has no known allergies. Review of Systems Constitutional: Positive for fever. HENT: Negative for congestion. Eyes: Negative for visual disturbance. Respiratory: Positive for cough and shortness of breath. Cardiovascular: Negative for chest pain. Gastrointestinal: Positive for abdominal pain, diarrhea, nausea and vomiting. Genitourinary: Negative for dysuria. Musculoskeletal: Negative for back pain and neck pain. Skin: Negative for rash. Neurological: Negative for headaches. Psychiatric/Behavioral: Negative for confusion. There were no vitals filed for this visit. Physical Exam  
Constitutional: She appears well-developed and well-nourished. No distress. HENT:  
Head: Normocephalic and atraumatic. Mouth/Throat: Oropharynx is clear and moist.  
Eyes: Conjunctivae are normal. Pupils are equal, round, and reactive to light. Neck: Normal range of motion. Cardiovascular: Regular rhythm. Tachycardia present. Pulmonary/Chest:  
Slight increased respiratory effort. Breath sounds unremarkable. Abdominal: Soft. There is tenderness in the suprapubic area. There is no rigidity, no rebound and no guarding. Musculoskeletal: Normal range of motion. Trace edema Neurological: She is alert. No cranial nerve deficit. She exhibits normal muscle tone. Coordination normal.  
Oriented to person and place Skin: Skin is warm and dry. Psychiatric: She has a normal mood and affect. Her behavior is normal.  
Nursing note and vitals reviewed. MDM Number of Diagnoses or Management Options Fever, unknown origin:  
Sepsis, due to unspecified organism [facility]):  
Diagnosis management comments: Lab work reveals a leukocytosis 14.3 and lactic acid 2.75. Mild renal insufficiency creatinine 1.39. Chest x-ray no acute abnormality. Urinalysis no definite infection. Influenza negative.   CT abdomen and pelvis no acute abnormality Within the abdomen however possible \"infectious process in the lung bases\". Patient has been hydrated with normal saline. She already received Zosyn by EMS. Blood cultures and urine cultures pending. Discussed with hospitalist for admission for further supportive care until cultures are complete. Amount and/or Complexity of Data Reviewed Clinical lab tests: ordered and reviewed Tests in the radiology section of CPT®: ordered and reviewed Tests in the medicine section of CPT®: ordered and reviewed Discuss the patient with other providers: yes Independent visualization of images, tracings, or specimens: yes Risk of Complications, Morbidity, and/or Mortality Presenting problems: moderate Diagnostic procedures: moderate Management options: moderate Procedures

## 2018-12-11 LAB
ANION GAP SERPL CALC-SCNC: 8 MMOL/L (ref 7–16)
BASOPHILS # BLD: 0 K/UL (ref 0–0.2)
BASOPHILS NFR BLD: 0 % (ref 0–2)
BUN SERPL-MCNC: 17 MG/DL (ref 8–23)
CALCIUM SERPL-MCNC: 10.1 MG/DL (ref 8.3–10.4)
CHLORIDE SERPL-SCNC: 115 MMOL/L (ref 98–107)
CO2 SERPL-SCNC: 23 MMOL/L (ref 21–32)
CREAT SERPL-MCNC: 1.17 MG/DL (ref 0.6–1)
DIFFERENTIAL METHOD BLD: ABNORMAL
EOSINOPHIL # BLD: 0 K/UL (ref 0–0.8)
EOSINOPHIL NFR BLD: 0 % (ref 0.5–7.8)
ERYTHROCYTE [DISTWIDTH] IN BLOOD BY AUTOMATED COUNT: 14.8 % (ref 11.9–14.6)
GLUCOSE SERPL-MCNC: 96 MG/DL (ref 65–100)
HCT VFR BLD AUTO: 36 % (ref 35.8–46.3)
HGB BLD-MCNC: 10.5 G/DL (ref 11.7–15.4)
IMM GRANULOCYTES # BLD: 0.1 K/UL (ref 0–0.5)
IMM GRANULOCYTES NFR BLD AUTO: 1 % (ref 0–5)
LYMPHOCYTES # BLD: 2.3 K/UL (ref 0.5–4.6)
LYMPHOCYTES NFR BLD: 22 % (ref 13–44)
MCH RBC QN AUTO: 30.2 PG (ref 26.1–32.9)
MCHC RBC AUTO-ENTMCNC: 29.2 G/DL (ref 31.4–35)
MCV RBC AUTO: 103.4 FL (ref 79.6–97.8)
MONOCYTES # BLD: 1 K/UL (ref 0.1–1.3)
MONOCYTES NFR BLD: 9 % (ref 4–12)
NEUTS SEG # BLD: 6.9 K/UL (ref 1.7–8.2)
NEUTS SEG NFR BLD: 67 % (ref 43–78)
NRBC # BLD: 0 K/UL (ref 0–0.2)
PLATELET # BLD AUTO: 139 K/UL (ref 150–450)
PMV BLD AUTO: 10.7 FL (ref 9.4–12.3)
POTASSIUM SERPL-SCNC: 4.1 MMOL/L (ref 3.5–5.1)
RBC # BLD AUTO: 3.48 M/UL (ref 4.05–5.2)
SODIUM SERPL-SCNC: 146 MMOL/L (ref 136–145)
WBC # BLD AUTO: 10.3 K/UL (ref 4.3–11.1)

## 2018-12-11 PROCEDURE — 97165 OT EVAL LOW COMPLEX 30 MIN: CPT

## 2018-12-11 PROCEDURE — 87186 SC STD MICRODIL/AGAR DIL: CPT

## 2018-12-11 PROCEDURE — 87449 NOS EACH ORGANISM AG IA: CPT

## 2018-12-11 PROCEDURE — 74011000258 HC RX REV CODE- 258: Performed by: INTERNAL MEDICINE

## 2018-12-11 PROCEDURE — 85025 COMPLETE CBC W/AUTO DIFF WBC: CPT

## 2018-12-11 PROCEDURE — 77010033678 HC OXYGEN DAILY

## 2018-12-11 PROCEDURE — 87045 FECES CULTURE AEROBIC BACT: CPT

## 2018-12-11 PROCEDURE — 80048 BASIC METABOLIC PNL TOTAL CA: CPT

## 2018-12-11 PROCEDURE — 36415 COLL VENOUS BLD VENIPUNCTURE: CPT

## 2018-12-11 PROCEDURE — 74011250636 HC RX REV CODE- 250/636: Performed by: INTERNAL MEDICINE

## 2018-12-11 PROCEDURE — 87077 CULTURE AEROBIC IDENTIFY: CPT

## 2018-12-11 PROCEDURE — 65270000029 HC RM PRIVATE

## 2018-12-11 PROCEDURE — 74011250637 HC RX REV CODE- 250/637: Performed by: INTERNAL MEDICINE

## 2018-12-11 RX ORDER — ENOXAPARIN SODIUM 100 MG/ML
40 INJECTION SUBCUTANEOUS EVERY 24 HOURS
Status: DISCONTINUED | OUTPATIENT
Start: 2018-12-11 | End: 2018-12-17 | Stop reason: HOSPADM

## 2018-12-11 RX ADMIN — Medication 10 ML: at 14:30

## 2018-12-11 RX ADMIN — ACETAMINOPHEN 1000 MG: 500 TABLET, FILM COATED ORAL at 08:34

## 2018-12-11 RX ADMIN — ACETAMINOPHEN 1000 MG: 500 TABLET, FILM COATED ORAL at 21:49

## 2018-12-11 RX ADMIN — PIPERACILLIN SODIUM,TAZOBACTAM SODIUM 4.5 G: 4; .5 INJECTION, POWDER, FOR SOLUTION INTRAVENOUS at 20:49

## 2018-12-11 RX ADMIN — GABAPENTIN 300 MG: 300 CAPSULE ORAL at 08:34

## 2018-12-11 RX ADMIN — ASPIRIN 81 MG: 81 TABLET, COATED ORAL at 08:34

## 2018-12-11 RX ADMIN — Medication 1 AMPULE: at 08:33

## 2018-12-11 RX ADMIN — Medication 10 ML: at 21:53

## 2018-12-11 RX ADMIN — ENOXAPARIN SODIUM 40 MG: 40 INJECTION, SOLUTION INTRAVENOUS; SUBCUTANEOUS at 21:49

## 2018-12-11 RX ADMIN — LORATADINE 10 MG: 10 TABLET ORAL at 08:34

## 2018-12-11 RX ADMIN — FLUTICASONE PROPIONATE 1 SPRAY: 50 SPRAY, METERED NASAL at 21:53

## 2018-12-11 RX ADMIN — GABAPENTIN 300 MG: 300 CAPSULE ORAL at 21:49

## 2018-12-11 RX ADMIN — PIPERACILLIN SODIUM,TAZOBACTAM SODIUM 4.5 G: 4; .5 INJECTION, POWDER, FOR SOLUTION INTRAVENOUS at 10:38

## 2018-12-11 RX ADMIN — Medication 10 ML: at 06:43

## 2018-12-11 RX ADMIN — VANCOMYCIN HYDROCHLORIDE 1000 MG: 1 INJECTION, POWDER, LYOPHILIZED, FOR SOLUTION INTRAVENOUS at 17:21

## 2018-12-11 RX ADMIN — Medication 1 AMPULE: at 21:49

## 2018-12-11 RX ADMIN — PIPERACILLIN SODIUM,TAZOBACTAM SODIUM 4.5 G: 4; .5 INJECTION, POWDER, FOR SOLUTION INTRAVENOUS at 01:39

## 2018-12-11 RX ADMIN — LEVOTHYROXINE SODIUM 75 MCG: 75 TABLET ORAL at 06:39

## 2018-12-11 RX ADMIN — SODIUM CHLORIDE 75 ML/HR: 900 INJECTION, SOLUTION INTRAVENOUS at 08:49

## 2018-12-11 RX ADMIN — GABAPENTIN 300 MG: 300 CAPSULE ORAL at 17:21

## 2018-12-11 RX ADMIN — ACETAMINOPHEN 1000 MG: 500 TABLET, FILM COATED ORAL at 17:21

## 2018-12-11 RX ADMIN — CYANOCOBALAMIN TAB 1000 MCG 1000 MCG: 1000 TAB at 08:34

## 2018-12-11 NOTE — PROGRESS NOTES
Currently in bed, resting quietly. Alert and oriented x 4. No loose stools noted, pt aware of staff needing a sputum cx. Iv abx infusing, no other needs at this time, call light within reach, o2 on at 2L.

## 2018-12-11 NOTE — ED NOTES
Pt being admitted for sepsis and fever of unknown origin. Pt was given IVF and repeat lactic acid had came down to 1.28. Sepsis discussed with pt, she appears to understand. Will continue to monitor.

## 2018-12-11 NOTE — PROGRESS NOTES
Patient is long term resident at [facility]. Patient is on 10 bed hold. Patient can return to facility once medically stable. Care Management Interventions PCP Verified by CM: Yes Mode of Transport at Discharge: Other (see comment) Transition of Care Consult (CM Consult): [facility] Planning, [facility] Physical Therapy Consult: Yes Occupational Therapy Consult: Yes Current Support Network: [address] [place] [address] Confirm Follow Up Transport: Other (see comment) Plan discussed with Pt/Family/Caregiver: Yes Freedom of Choice Offered: Yes Discharge Location Discharge Placement: [facility]

## 2018-12-11 NOTE — PROGRESS NOTES
Hospitalist Progress Note Admit Date:  12/10/2018 12:12 PM  
Name:  Halley Lundberg Age:  80 y.o. 
:  1930 MRN:  [id number] PCP:  Clarissa Everett MD 
Treatment Team: Attending Provider: Gurmeet Andersen MD; Utilization Review: Maggie Archibald RN Subjective:  
From H and P HPI: 
Jim Muro is an 81 yo F long-term resident at [facility] with history of debility, recent L humeral neck fracture on , hypothyroidism, who presents to the ED with fever. She reports she started feeling poorly about 4 days ago and developed a dry cough. Then, she developed nausea (few episodes of vomiting) and diarrhea the next day. She has had some slight abdominal discomfort. She reports mild shortness of breath now. She denies any chest pain. Today, she was noted to have a fever of 103 and coughing more with wheezing (according to paperwork from [facility]). She was brought to the ED via EMS and given zosyn en route. In ED: WBC 14.3, lactic acid 2.75-->1.28 with IVF bolus, UA with trace bacteria and leuks, tachy with heart rate 125 (sinus tach on EKG), influenza negative. CT abdomen/pelvis showed no significant intra-abdominal pathology, but did show bibasilar airspace disease. 
  
Hospitalist service asked to admit for sepsis 18: No gross distress or new complaints. Denies dyspnea at rest unless coughing. Objective:  
 
Patient Vitals for the past 24 hrs: 
 Temp Pulse Resp BP SpO2  
18 0713 97.5 °F (36.4 °C) 99 18 132/76 97 % 18 0411 98 °F (36.7 °C) 84 18 116/75 98 % 12/10/18 2357 98 °F (36.7 °C) 88 18 134/66 97 % 12/10/18 1946 98.1 °F (36.7 °C) 94 18 136/63 98 % 12/10/18 1756 98.7 °F (37.1 °C) (!) 108 20 177/77 90 % 12/10/18 1520  (!) 102  143/62 93 % 12/10/18 1501  (!) 101  132/60 92 % 12/10/18 1441  (!) 103  138/61 93 % 12/10/18 1421  (!) 108  150/62 93 % 12/10/18 1401  (!) 109  140/61 93 % 12/10/18 1232 (!) 101 °F (38.3 °C) (!) 125 20 149/64 92 % Oxygen Therapy O2 Sat (%): 97 % (12/11/18 0713) Pulse via Oximetry: 103 beats per minute (12/10/18 1520) O2 Device: Nasal cannula (12/10/18 1757) O2 Flow Rate (L/min): 2 l/min (12/10/18 1757) No intake or output data in the 24 hours ending 12/11/18 0730 Physical Examination: 
 
 
Physical Exam  
Constitutional: No distress. HENT:  
Head: Normocephalic and atraumatic. Eyes: Conjunctivae are normal. Pupils are equal, round, and reactive to light. Neck: No tracheal deviation present. Cardiovascular: Normal rate and regular rhythm. Exam reveals no gallop and no friction rub. No murmur heard. Pulmonary/Chest: No stridor. She has no wheezes. She has no rales. She exhibits no tenderness. Abdominal: Bowel sounds are normal. She exhibits no distension and no mass. There is no rebound. Musculoskeletal: She exhibits no edema, tenderness or deformity. Lymphadenopathy:  
  She has no cervical adenopathy. Neurological: She is alert. Coordination normal.  
Skin: Skin is warm. No rash noted. She is not diaphoretic. Data Review: 
I have reviewed all labs, meds, telemetry events, and studies from the last 24 hours. Recent Results (from the past 24 hour(s)) EKG, 12 LEAD, INITIAL Collection Time: 12/10/18 12:23 PM  
Result Value Ref Range Ventricular Rate 125 BPM  
 Atrial Rate 125 BPM  
 P-R Interval 160 ms QRS Duration 96 ms  
 Q-T Interval 300 ms QTC Calculation (Bezet) 433 ms Calculated P Axis 62 degrees Calculated R Axis -6 degrees Calculated T Axis 76 degrees Diagnosis    
  !! AGE AND GENDER SPECIFIC ECG ANALYSIS !! Sinus tachycardia Left ventricular hypertrophy with repolarization abnormality Abnormal ECG When compared with ECG of 28-SEP-2018 07:26, 
Minimal criteria for Septal infarct are now Present ST no longer depressed in Inferior leads Confirmed by Laya Cook MD (), LUCY DE LA FUENTE ([id number]) on 12/10/2018 1:45:37 PM 
  
PROCALCITONIN Collection Time: 12/10/18 12:24 PM  
Result Value Ref Range Procalcitonin 1.2 ng/mL METABOLIC PANEL, COMPREHENSIVE Collection Time: 12/10/18 12:24 PM  
Result Value Ref Range Sodium 138 136 - 145 mmol/L Potassium 4.6 3.5 - 5.1 mmol/L Chloride 106 98 - 107 mmol/L  
 CO2 25 21 - 32 mmol/L Anion gap 7 7 - 16 mmol/L Glucose 152 (H) 65 - 100 mg/dL BUN 18 8 - 23 MG/DL Creatinine 1.39 (H) 0.6 - 1.0 MG/DL  
 GFR est AA 46 (L) >60 ml/min/1.73m2 GFR est non-AA 38 (L) >60 ml/min/1.73m2 Calcium 10.8 (H) 8.3 - 10.4 MG/DL Bilirubin, total 0.8 0.2 - 1.1 MG/DL  
 ALT (SGPT) 31 12 - 65 U/L  
 AST (SGOT) 44 (H) 15 - 37 U/L Alk. phosphatase 169 (H) 50 - 136 U/L Protein, total 8.8 (H) 6.3 - 8.2 g/dL Albumin 2.3 (L) 3.2 - 4.6 g/dL Globulin 6.5 (H) 2.3 - 3.5 g/dL A-G Ratio 0.4 (L) 1.2 - 3.5    
CBC WITH AUTOMATED DIFF Collection Time: 12/10/18 12:24 PM  
Result Value Ref Range WBC 14.3 (H) 4.3 - 11.1 K/uL  
 RBC 3.71 (L) 4.05 - 5.2 M/uL  
 HGB 11.2 (L) 11.7 - 15.4 g/dL HCT 36.8 35.8 - 46.3 % MCV 99.2 (H) 79.6 - 97.8 FL  
 MCH 30.2 26.1 - 32.9 PG  
 MCHC 30.4 (L) 31.4 - 35.0 g/dL  
 RDW 14.5 11.9 - 14.6 % PLATELET 869 816 - 237 K/uL MPV 11.3 9.4 - 12.3 FL ABSOLUTE NRBC 0.00 0.0 - 0.2 K/uL  
 DF AUTOMATED NEUTROPHILS 79 (H) 43 - 78 % LYMPHOCYTES 12 (L) 13 - 44 % MONOCYTES 8 4.0 - 12.0 % EOSINOPHILS 0 (L) 0.5 - 7.8 % BASOPHILS 0 0.0 - 2.0 % IMMATURE GRANULOCYTES 1 0.0 - 5.0 %  
 ABS. NEUTROPHILS 11.3 (H) 1.7 - 8.2 K/UL  
 ABS. LYMPHOCYTES 1.7 0.5 - 4.6 K/UL  
 ABS. MONOCYTES 1.1 0.1 - 1.3 K/UL  
 ABS. EOSINOPHILS 0.0 0.0 - 0.8 K/UL  
 ABS. BASOPHILS 0.0 0.0 - 0.2 K/UL  
 ABS. IMM. GRANS. 0.2 0.0 - 0.5 K/UL POC LACTIC ACID Collection Time: 12/10/18 12:32 PM  
Result Value Ref Range  Lactic Acid (POC) 2.75 (H) 0.5 - 1.9 mmol/L  
 URINALYSIS W/ RFLX MICROSCOPIC Collection Time: 12/10/18  1:28 PM  
Result Value Ref Range Color YELLOW Appearance CLOUDY Specific gravity 1.017 1.001 - 1.023    
 pH (UA) 6.0 5.0 - 9.0 Protein 100 (A) NEG mg/dL Glucose NEGATIVE  mg/dL Ketone NEGATIVE  NEG mg/dL Bilirubin NEGATIVE  NEG Blood LARGE (A) NEG Urobilinogen 0.2 0.2 - 1.0 EU/dL Nitrites POSITIVE (A) NEG Leukocyte Esterase NEGATIVE  NEG    
 WBC 0-3 0 /hpf  
 RBC 20-50 0 /hpf Epithelial cells 0-3 0 /hpf Bacteria TRACE 0 /hpf Casts 0 0 /lpf INFLUENZA A & B AG (RAPID TEST) Collection Time: 12/10/18  1:29 PM  
Result Value Ref Range Influenza A Ag NEGATIVE  NEG Influenza B Ag NEGATIVE  NEG Source NASOPHARYNGEAL    
POC LACTIC ACID Collection Time: 12/10/18  3:26 PM  
Result Value Ref Range Lactic Acid (POC) 1.28 0.5 - 1.9 mmol/L METABOLIC PANEL, BASIC Collection Time: 12/11/18  5:19 AM  
Result Value Ref Range Sodium 146 (H) 136 - 145 mmol/L Potassium 4.1 3.5 - 5.1 mmol/L Chloride 115 (H) 98 - 107 mmol/L  
 CO2 23 21 - 32 mmol/L Anion gap 8 7 - 16 mmol/L Glucose 96 65 - 100 mg/dL BUN 17 8 - 23 MG/DL Creatinine 1.17 (H) 0.6 - 1.0 MG/DL  
 GFR est AA 56 (L) >60 ml/min/1.73m2 GFR est non-AA 46 (L) >60 ml/min/1.73m2 Calcium 10.1 8.3 - 10.4 MG/DL  
CBC WITH AUTOMATED DIFF Collection Time: 12/11/18  5:19 AM  
Result Value Ref Range WBC 10.3 4.3 - 11.1 K/uL  
 RBC 3.48 (L) 4.05 - 5.2 M/uL  
 HGB 10.5 (L) 11.7 - 15.4 g/dL HCT 36.0 35.8 - 46.3 % .4 (H) 79.6 - 97.8 FL  
 MCH 30.2 26.1 - 32.9 PG  
 MCHC 29.2 (L) 31.4 - 35.0 g/dL  
 RDW 14.8 (H) 11.9 - 14.6 % PLATELET 973 (L) 290 - 450 K/uL MPV 10.7 9.4 - 12.3 FL ABSOLUTE NRBC 0.00 0.0 - 0.2 K/uL  
 DF AUTOMATED NEUTROPHILS 67 43 - 78 % LYMPHOCYTES 22 13 - 44 % MONOCYTES 9 4.0 - 12.0 % EOSINOPHILS 0 (L) 0.5 - 7.8 %  BASOPHILS 0 0.0 - 2.0 %  
 IMMATURE GRANULOCYTES 1 0.0 - 5.0 %  
 ABS. NEUTROPHILS 6.9 1.7 - 8.2 K/UL  
 ABS. LYMPHOCYTES 2.3 0.5 - 4.6 K/UL  
 ABS. MONOCYTES 1.0 0.1 - 1.3 K/UL  
 ABS. EOSINOPHILS 0.0 0.0 - 0.8 K/UL  
 ABS. BASOPHILS 0.0 0.0 - 0.2 K/UL  
 ABS. IMM. GRANS. 0.1 0.0 - 0.5 K/UL All Micro Results Procedure Component Value Units Date/Time CULTURE, URINE [[id number]] Collected:  12/10/18 1328 Order Status:  Completed Specimen:  Cath Urine Updated:  12/10/18 2019 CULTURE, RESPIRATORY/SPUTUM/BRONCH [place] STAIN [[id number]] Order Status:  Sent Specimen:  Sputum CULTURE, STOOL [[id number]] Order Status:  Sent Specimen:  Stool C. DIFFICILE AG & TOXIN A/B [[id number]] Order Status:  Sent Specimen:  Stool INFLUENZA A & B AG (RAPID TEST) [[id number]] Collected:  12/10/18 1329 Order Status:  Completed Specimen:  Nasopharyngeal from Nasal washing Updated:  12/10/18 1359 Influenza A Ag NEGATIVE Comment: NEGATIVE FOR THE PRESENCE OF INFLUENZA A ANTIGEN 
INFECTION DUE TO INFLUENZA A CANNOT BE RULED OUT. BECAUSE THE ANTIGEN PRESENT IN THE SAMPLE MAY BE BELOW 
THE DETECTION LIMIT OF THE TEST. A NEGATIVE TEST IS PRESUMPTIVE AND IT IS RECOMMENDED THAT THESE RESULTS BE CONFIRMED BY VIRAL CULTURE OR AN FDA-CLEARED INFLUENZA A AND B MOLECULAR ASSAY. Influenza B Ag NEGATIVE Comment: NEGATIVE FOR THE PRESENCE OF INFLUENZA B ANTIGEN 
INFECTION DUE TO INFLUENZA B CANNOT BE RULED OUT. BECAUSE THE ANTIGEN PRESENT IN THE SAMPLE MAY BE BELOW 
THE DETECTION LIMIT OF THE TEST. A NEGATIVE TEST IS PRESUMPTIVE AND IT IS RECOMMENDED THAT THESE RESULTS BE CONFIRMED BY VIRAL CULTURE OR AN FDA-CLEARED INFLUENZA A AND B MOLECULAR ASSAY. Source NASOPHARYNGEAL     
 CULTURE, BLOOD [[id number]] Collected:  12/10/18 1237 Order Status:  Completed Specimen:  Blood Updated:  12/10/18 1317 CULTURE, BLOOD [[id number]] Collected:  12/10/18 1224 Order Status:  Completed Specimen:  Blood Updated:  12/10/18 131 Current Meds: 
Current Facility-Administered Medications Medication Dose Route Frequency  sodium chloride (NS) flush 5-10 mL  5-10 mL IntraVENous PRN  
 acetaminophen (TYLENOL) tablet 1,000 mg  1,000 mg Oral TID  aspirin delayed-release tablet 81 mg  81 mg Oral DAILY  cyanocobalamin tablet 1,000 mcg  1,000 mcg Oral DAILY  fluticasone (FLONASE) 50 mcg/actuation nasal spray 1 Spray  1 Spray Both Nostrils QHS  gabapentin (NEURONTIN) capsule 300 mg  300 mg Oral TID  levothyroxine (SYNTHROID) tablet 75 mcg  75 mcg Oral ACB  loratadine (CLARITIN) tablet 10 mg  10 mg Oral DAILY  sodium chloride (NS) flush 5-10 mL  5-10 mL IntraVENous Q8H  
 sodium chloride (NS) flush 5-10 mL  5-10 mL IntraVENous PRN  piperacillin-tazobactam (ZOSYN) 4.5 g in 0.9% sodium chloride (MBP/ADV) 100 mL  4.5 g IntraVENous Q8H  
 enoxaparin (LOVENOX) injection 30 mg  30 mg SubCUTAneous Q24H  
 ondansetron (ZOFRAN) injection 4 mg  4 mg IntraVENous Q6H PRN  
 albuterol (PROVENTIL VENTOLIN) nebulizer solution 2.5 mg  2.5 mg Nebulization Q6H PRN  
 0.9% sodium chloride infusion  75 mL/hr IntraVENous CONTINUOUS  
 alcohol 62% (NOZIN) nasal  1 Ampule  1 Ampule Topical Q12H  
 vancomycin (VANCOCIN) 1,000 mg in 0.9% sodium chloride (MBP/ADV) 250 mL  1,000 mg IntraVENous Q24H Diet: DIET REGULAR Other Studies (last 24 hours): 
Ct Abd Pelv W Cont Result Date: 12/10/2018 CT abdomen and pelvis done with IV contrast using ER protocol December 10, 2018 Reference exam: None Indication: Abdomen pain Technique: Radiation dose reduction techniques were used for this study using one or more of the following: automated exposure control; adjustment of mA and/or kV (according to patient size);  iterative reconstruction.  5 mm axial images were taken through the abdomen and pelvis using IV contrast using ER protocol. 100 cc Isovue 370 IV contrast was administered to better evaluate the abdominal and pelvic contents. Abdomen: Patient's hands are seen across the abdomen and decreasing sensitivity with artifact. Respiratory motion also decreases sensitivity but patchy opacities are seen in the lung bases that may be infiltrates with small effusions present. The liver, spleen, adrenals appear normal, the pancreas shows fatty replacement, gallbladder appears to be absent. Kidneys appear normal. There is no intraperitoneal free air, ascites, nor abnormal adenopathy seen. Pelvis: Patient has a high riding cecum, the appendix is not seen. There are multiple diverticula but no evidence of diverticulitis seen, the uterus is present, the bladder and pelvic sidewalls appear normal. What appears to be a mesh is seen anteriorly. IMPRESSION: Possible infectious process in the lung bases. Decrease sensitivity as described above. There are diverticula along the colon but no evidence of active diverticulitis seen. Xr Chest [facility] Result Date: 12/10/2018 Single portable upright chest x-ray December 10, 2018 Reference exam: September 28, 2018 INDICATION: Michelle Aguilera in October, left shoulder pain FINDINGS: Lungs are underexpanded with patient rotated to the left, cardiac silhouette is still considered somewhat enlarged but pulmonary vascularity appears normal. Old fracture of the left shoulder is again seen. IMPRESSION: Lungs underinflated but cardiac silhouette still felt to be enlarged. If a bony injury is suspected dedicated imaging should be considered. Assessment and Plan:  
 
Hospital Problems as of 12/11/2018 Date Reviewed: 9/7/2016 Codes Class Noted - Resolved POA * (Principal) [facility] ICD-10-CM: A41.9 ICD-9-CM: 038.9, 995.91  12/10/2018 - Present Yes HCAP (healthcare-associated pneumonia) ICD-10-CM: J18.9 ICD-9-CM: 937  12/10/2018 - Present Yes Non-intractable vomiting with nausea ICD-10-CM: R11.2 ICD-9-CM: 787.01  12/10/2018 - Present Yes Diarrhea of presumed infectious origin ICD-10-CM: R19.7 ICD-9-CM: 009.3  12/10/2018 - Present Yes  
   
 CKD (chronic kidney disease) stage 3, GFR 30-59 ml/min ([facility]) ICD-10-CM: N18.3 ICD-9-CM: 585.3  2/19/2017 - Present Yes A/P:   
· Sepsis from suspected HCAP (due to residing in SNF)-  
§ Zosyn and vancomycin- de-escalate pending culture results and clinical improvement. § Cultures ngtd. Wean oxygen as able 
  
· Nausea/vomiting/diarrhea § Stool studies pnd § Continue to Hold Senna and colace; resume when diarrhea resolves 
  
· Hypothyroidism- continue levothyroxine this is unchanged 
  
· Debility- OOB to chair prn. 
  
 bed hold at [facility].  
  
 
  
Code Status: DNR  
 Surrogate decision maker- Matthewsvetacaridad Talley (daughter)-

## 2018-12-11 NOTE — ED NOTES
Sepsis RN notified pt brought in by EMS for sepsis. Sepsis RN initiated second IV line and obtained second set of blood cultures. First set drawn by EMS and sent to lab. Pt was given zosyn en route to ER by EMS. Will continue to monitor pt while in ED.

## 2018-12-11 NOTE — PROGRESS NOTES
Interdisciplinary Rounds completed 12/11/18. Nursing, Case Management, Physician and PT present. Plan of care reviewed and updated[facility] Will return at d/c. Still need to collect stool for C_diff.

## 2018-12-11 NOTE — PROGRESS NOTES
Problem: Self Care Deficits Care Plan (Adult)  Goal: *Acute Goals and Plan of Care (Insert Text)    1. Pt will complete functional transfers for ADLs with mod assist  2. Pt will complete grooming and hygiene with set up  3. Pt will demonstrate independence with HEP to promote increased BUE strength and functional use for ADLs  4. Pt will tolerate 23 minutes functional activity with 4 or fewer rest breaks to promote increased endurance for ADLs  5. Pt will complete bed mobility with min assistance in prep for ADLs    Timeframe: 7 days      OCCUPATIONAL THERAPY: Initial Assessment 12/11/2018  INPATIENT: Hospital Day: 2  [place] / Product Type: Medicare /      NAME/AGE/GENDER: Navi Vieira is a 80 y.o. female   PRIMARY DIAGNOSIS:  Sepsis ([address] Sepsis ([facility] [address] Sepsis ([facility] [address]       ICD-10: Treatment Diagnosis:    · Generalized Muscle Weakness (M62.81)   Precautions/Allergies:    falls Patient has no known allergies. ASSESSMENT:     Ms. Ann Marie Paiz was admitted with the above, has a history of debility and L humerus fx in September 2018 and is a long time resident at [facility]. Pt reports that she was WC bound, required assistance for all bathing, dressing, toileting, bed mobility, and transfers and was able to feed herself and complete hygiene with set up. This session, pt presented weak and deconditioned with deficits in endurance, mobility, strength, and balance impacting ADLs. Pt required max X2 for bed mobility, demonstrated decreased sitting balance EOB and required CGA-min assistance to maintain. Pt became SOB and fatigued quickly with min exertion, tolerated sitting EOB ~5 minutes. SpO2 monitored and remained in high 90s. Pt has < shoulder ROM in all planes and decreased strength at all joints, most significant in L shoulder d/t recent humerus fx.  Pt is below her functional baseline and would benefit from skilled OT services to address deficits, anticipate d/c back to rehab facility when medically stable. This section established at most recent assessment   PROBLEM LIST (Impairments causing functional limitations):  1. Decreased Strength  2. Decreased ADL/Functional Activities  3. Decreased Transfer Abilities  4. Decreased Balance  5. Decreased Activity Tolerance  6. Increased Fatigue  7. Increased Shortness of Breath   INTERVENTIONS PLANNED: (Benefits and precautions of occupational therapy have been discussed with the patient.)  1. Activities of daily living training  2. Adaptive equipment training  3. Balance training  4. Therapeutic activity  5. Therapeutic exercise     TREATMENT PLAN: Frequency/Duration: Follow patient 3 times/ week to address above goals. Rehabilitation Potential For Stated Goals: Fair     RECOMMENDED REHABILITATION/EQUIPMENT: (at time of discharge pending progress): Due to the probability of continued deficits (see above) this patient will likely need continued skilled occupational therapy after discharge. Equipment:    None at this time              OCCUPATIONAL PROFILE AND HISTORY:   History of Present Injury/Illness (Reason for Referral):  See H&P  Past Medical History/Comorbidities:   Ms. Jesus Stage  has a past medical history of Arthritis, Headache(784.0), Hypertension, Ill-defined condition, Ill-defined condition, Psychiatric disorder, and Thyroid disease. Ms. Jesus Stage  has a past surgical history that includes pr cardiac surg procedure unlist; hx cholecystectomy; and hx orthopaedic.   Social History/Living Environment:   Home Environment: [address] [place] [address] Name: Hermilo sagastume  One/Two Story Residence: One story  Living Alone: No  Support Systems: Skilled nursing facility  Patient Expects to be Discharged to[de-identified] 2 [facility]  Current DME Used/Available at Home: Wheelchair  Tub or Shower Type: Shower  Prior Level of Function/Work/Activity:  Pt reports that she was WC bound, required assistance for all bathing, dressing, toileting, bed mobility, and transfers and was able to feed herself and complete hygiene with set up. Number of Personal Factors/Comorbidities that affect the Plan of Care: Expanded review of therapy/medical records (1-2):  MODERATE COMPLEXITY   ASSESSMENT OF OCCUPATIONAL PERFORMANCE[de-identified]   Activities of Daily Living:   Basic ADLs (From Assessment) Complex ADLs (From Assessment)   Feeding: Setup  Oral Facial Hygiene/Grooming: Minimum assistance  Bathing: Maximum assistance  Upper Body Dressing: Moderate assistance  Lower Body Dressing: Maximum assistance  Toileting: Maximum assistance Instrumental ADL  Meal Preparation: Total assistance  Homemaking:  Total assistance  Medication Management: Maximum assistance  Financial Management: Maximum assistance   Grooming/Bathing/Dressing Activities of Daily Living     Cognitive Retraining  Safety/Judgement: Fall prevention                       Bed/Mat Mobility  Supine to Sit: Maximum assistance;Assist x2  Sit to Supine: Maximum assistance;Assist x2  Scooting: Maximum assistance;Assist x2       Most Recent Physical Functioning:   Gross Assessment:  AROM: Generally decreased, functional  Strength: Generally decreased, functional  Coordination: Generally decreased, functional               Posture:     Balance:  Sitting: Impaired  Sitting - Static: Fair (occasional)  Sitting - Dynamic: Fair (occasional) Bed Mobility:  Supine to Sit: Maximum assistance;Assist x2  Sit to Supine: Maximum assistance;Assist x2  Scooting: Maximum assistance;Assist x2  Wheelchair Mobility:     Transfers:               Patient Vitals for the past 6 hrs:   BP SpO2 Pulse   12/11/18 1214 115/55 97 % 83   12/11/18 1542 132/71 91 % 91       Mental Status  Neurologic State: Alert  Orientation Level: Oriented to person, Oriented to place  Cognition: Follows commands  Perception: Appears intact  Perseveration: No perseveration noted  Safety/Judgement: Fall prevention Physical Skills Involved:  1. Range of Motion  2. Balance  3. Strength  4. Activity Tolerance Cognitive Skills Affected (resulting in the inability to perform in a timely and safe manner):  1. Sustained Attention Psychosocial Skills Affected:  1. Habits/Routines  2. Environmental Adaptation   Number of elements that affect the Plan of Care: 3-5:  MODERATE COMPLEXITY   CLINICAL DECISION MAKING:   [facility] AM-PAC 6 Clicks   Daily Activity Inpatient Short Form  How much help from another person does the patient currently need. .. Total A Lot A Little None   1. Putting on and taking off regular lower body clothing? [x] 1   [] 2   [] 3   [] 4   2. Bathing (including washing, rinsing, drying)? [] 1   [x] 2   [] 3   [] 4   3. Toileting, which includes using toilet, bedpan or urinal?   [x] 1   [] 2   [] 3   [] 4   4. Putting on and taking off regular upper body clothing? [] 1   [x] 2   [] 3   [] 4   5. Taking care of personal grooming such as brushing teeth? [] 1   [] 2   [x] 3   [] 4   6. Eating meals? [] 1   [] 2   [x] 3   [] 4   © 2007, Trustees of [facility], under license to [employer]. All rights reserved      Score:  Initial: 12 Most Recent: X (Date: -- )    Interpretation of Tool:  Represents activities that are increasingly more difficult (i.e. Bed mobility, Transfers, Gait). Score 24 23 22-20 19-15 14-10 9-7 6     Modifier CH CI CJ CK CL CM CN      ? Self Care:     - CURRENT STATUS: CL - 60%-79% impaired, limited or restricted    - GOAL STATUS: CK - 40%-59% impaired, limited or restricted    - D/C STATUS:  ---------------To be determined---------------  [place] / Product Type: Medicare /      Medical Necessity:     · Patient is expected to demonstrate progress in strength, range of motion, balance and functional technique to increase independence with ADLs.   Reason for Services/Other Comments:  · Patient continues to require skilled intervention due to decreased strength,endurance, ADLs, and functional performance from baseline. Use of outcome tool(s) and clinical judgement create a POC that gives a: LOW COMPLEXITY         TREATMENT:   (In addition to Assessment/Re-Assessment sessions the following treatments were rendered)     Pre-treatment Symptoms/Complaints:    Pain: Initial:   Pain Intensity 1: 0  Post Session:  0     Assessment/Reassessment only, no treatment provided today    Braces/Orthotics/Lines/Etc:   · IV  · O2 Device: Nasal cannula  Treatment/Session Assessment:    · Response to Treatment:  Fatigue, SOB  · Interdisciplinary Collaboration:   o Occupational Therapist  o Registered Nurse  o OT student  · After treatment position/precautions:   o Supine in bed  o Bed/Chair-wheels locked  o Bed in low position  o Call light within reach  o RN notified  o Side rails x 3   · Compliance with Program/Exercises: Will assess as treatment progresses. · Recommendations/Intent for next treatment session: \"Next visit will focus on advancements to more challenging activities and reduction in assistance provided\".   Total Treatment Duration:  OT Patient Time In/Time Out  Time In: 1514  Time Out: [address] Laurelyn Leisure

## 2018-12-12 PROBLEM — A04.72 C. DIFFICILE COLITIS: Status: ACTIVE | Noted: 2018-12-12

## 2018-12-12 LAB
ANION GAP SERPL CALC-SCNC: 9 MMOL/L (ref 7–16)
BASOPHILS # BLD: 0.1 K/UL (ref 0–0.2)
BASOPHILS NFR BLD: 1 % (ref 0–2)
BUN SERPL-MCNC: 19 MG/DL (ref 8–23)
C DIFF GDH STL QL: ABNORMAL
C DIFF TOX A+B STL QL IA: ABNORMAL
CALCIUM SERPL-MCNC: 10.5 MG/DL (ref 8.3–10.4)
CHLORIDE SERPL-SCNC: 112 MMOL/L (ref 98–107)
CLINICAL CONSIDERATION: ABNORMAL
CO2 SERPL-SCNC: 24 MMOL/L (ref 21–32)
CREAT SERPL-MCNC: 1.18 MG/DL (ref 0.6–1)
DIFFERENTIAL METHOD BLD: ABNORMAL
EOSINOPHIL # BLD: 0.2 K/UL (ref 0–0.8)
EOSINOPHIL NFR BLD: 2 % (ref 0.5–7.8)
ERYTHROCYTE [DISTWIDTH] IN BLOOD BY AUTOMATED COUNT: 14.8 % (ref 11.9–14.6)
GLUCOSE SERPL-MCNC: 104 MG/DL (ref 65–100)
HCT VFR BLD AUTO: 36.2 % (ref 35.8–46.3)
HGB BLD-MCNC: 10.3 G/DL (ref 11.7–15.4)
IMM GRANULOCYTES # BLD: 0.5 K/UL (ref 0–0.5)
IMM GRANULOCYTES NFR BLD AUTO: 4 % (ref 0–5)
INTERPRETATION: ABNORMAL
LYMPHOCYTES # BLD: 2 K/UL (ref 0.5–4.6)
LYMPHOCYTES NFR BLD: 18 % (ref 13–44)
MCH RBC QN AUTO: 29.6 PG (ref 26.1–32.9)
MCHC RBC AUTO-ENTMCNC: 28.5 G/DL (ref 31.4–35)
MCV RBC AUTO: 104 FL (ref 79.6–97.8)
MONOCYTES # BLD: 0.9 K/UL (ref 0.1–1.3)
MONOCYTES NFR BLD: 8 % (ref 4–12)
NEUTS SEG # BLD: 7.6 K/UL (ref 1.7–8.2)
NEUTS SEG NFR BLD: 68 % (ref 43–78)
NRBC # BLD: 0 K/UL (ref 0–0.2)
PCR REFLEX: ABNORMAL
PLATELET # BLD AUTO: 174 K/UL (ref 150–450)
PMV BLD AUTO: 10.6 FL (ref 9.4–12.3)
POTASSIUM SERPL-SCNC: 4.2 MMOL/L (ref 3.5–5.1)
RBC # BLD AUTO: 3.48 M/UL (ref 4.05–5.2)
SODIUM SERPL-SCNC: 145 MMOL/L (ref 136–145)
VANCOMYCIN TROUGH SERPL-MCNC: 6.8 UG/ML (ref 5–20)
WBC # BLD AUTO: 11.3 K/UL (ref 4.3–11.1)

## 2018-12-12 PROCEDURE — 74011250637 HC RX REV CODE- 250/637: Performed by: INTERNAL MEDICINE

## 2018-12-12 PROCEDURE — 97162 PT EVAL MOD COMPLEX 30 MIN: CPT

## 2018-12-12 PROCEDURE — 65270000029 HC RM PRIVATE

## 2018-12-12 PROCEDURE — 77010033678 HC OXYGEN DAILY

## 2018-12-12 PROCEDURE — 85025 COMPLETE CBC W/AUTO DIFF WBC: CPT

## 2018-12-12 PROCEDURE — 74011250636 HC RX REV CODE- 250/636: Performed by: INTERNAL MEDICINE

## 2018-12-12 PROCEDURE — 77030019605

## 2018-12-12 PROCEDURE — 36415 COLL VENOUS BLD VENIPUNCTURE: CPT

## 2018-12-12 PROCEDURE — 74011000258 HC RX REV CODE- 258: Performed by: INTERNAL MEDICINE

## 2018-12-12 PROCEDURE — 80048 BASIC METABOLIC PNL TOTAL CA: CPT

## 2018-12-12 PROCEDURE — 80202 ASSAY OF VANCOMYCIN: CPT

## 2018-12-12 RX ORDER — LEVOFLOXACIN 5 MG/ML
750 INJECTION, SOLUTION INTRAVENOUS
Status: DISCONTINUED | OUTPATIENT
Start: 2018-12-12 | End: 2018-12-12

## 2018-12-12 RX ORDER — METRONIDAZOLE 500 MG/1
500 TABLET ORAL EVERY 8 HOURS
Status: DISCONTINUED | OUTPATIENT
Start: 2018-12-12 | End: 2018-12-12

## 2018-12-12 RX ORDER — VANCOMYCIN HYDROCHLORIDE
1250
Status: DISCONTINUED | OUTPATIENT
Start: 2018-12-13 | End: 2018-12-13

## 2018-12-12 RX ADMIN — PIPERACILLIN SODIUM,TAZOBACTAM SODIUM 4.5 G: 4; .5 INJECTION, POWDER, FOR SOLUTION INTRAVENOUS at 17:54

## 2018-12-12 RX ADMIN — LORATADINE 10 MG: 10 TABLET ORAL at 08:39

## 2018-12-12 RX ADMIN — PIPERACILLIN SODIUM,TAZOBACTAM SODIUM 4.5 G: 4; .5 INJECTION, POWDER, FOR SOLUTION INTRAVENOUS at 10:55

## 2018-12-12 RX ADMIN — ACETAMINOPHEN 1000 MG: 500 TABLET, FILM COATED ORAL at 17:54

## 2018-12-12 RX ADMIN — Medication 10 ML: at 13:49

## 2018-12-12 RX ADMIN — GABAPENTIN 300 MG: 300 CAPSULE ORAL at 17:54

## 2018-12-12 RX ADMIN — GABAPENTIN 300 MG: 300 CAPSULE ORAL at 08:39

## 2018-12-12 RX ADMIN — CYANOCOBALAMIN TAB 1000 MCG 1000 MCG: 1000 TAB at 08:39

## 2018-12-12 RX ADMIN — Medication 10 ML: at 05:15

## 2018-12-12 RX ADMIN — VANCOMYCIN HYDROCHLORIDE 125 MG: 1 INJECTION, POWDER, LYOPHILIZED, FOR SOLUTION INTRAVENOUS at 17:55

## 2018-12-12 RX ADMIN — Medication 1 AMPULE: at 08:39

## 2018-12-12 RX ADMIN — LEVOTHYROXINE SODIUM 75 MCG: 75 TABLET ORAL at 05:15

## 2018-12-12 RX ADMIN — VANCOMYCIN HYDROCHLORIDE 125 MG: 1 INJECTION, POWDER, LYOPHILIZED, FOR SOLUTION INTRAVENOUS at 13:49

## 2018-12-12 RX ADMIN — ACETAMINOPHEN 1000 MG: 500 TABLET, FILM COATED ORAL at 08:39

## 2018-12-12 RX ADMIN — Medication 10 ML: at 22:00

## 2018-12-12 RX ADMIN — ASPIRIN 81 MG: 81 TABLET, COATED ORAL at 08:39

## 2018-12-12 RX ADMIN — PIPERACILLIN SODIUM,TAZOBACTAM SODIUM 4.5 G: 4; .5 INJECTION, POWDER, FOR SOLUTION INTRAVENOUS at 03:11

## 2018-12-12 RX ADMIN — VANCOMYCIN HYDROCHLORIDE 1000 MG: 1 INJECTION, POWDER, LYOPHILIZED, FOR SOLUTION INTRAVENOUS at 17:56

## 2018-12-12 NOTE — PROGRESS NOTES
made initial visit. Pt was alert and verbal with no pain level expressed or observed. No family present.  welcomed pt to DT and shared information about  services.  provided spiritual care through presence, pastoral conversation, prayer, and assurance of prayer.

## 2018-12-12 NOTE — ED NOTES
Sepsis Response RN has rounded on patient this afternoon. Patient sitting upright in stretcher, eating lunch tray. Patient appears in no acute distress. Patient reports \"feeling a little better. \"  Reinforced patient education that Kwame Pena Sepsis Response RN had provided on 12/10. Patient very pleasant, denies having any questions. Opportunities for clarification provided. Patient provided sepsis hand out for reference as well. Available to participate in patient care as needed.

## 2018-12-12 NOTE — PROGRESS NOTES
Problem: Mobility Impaired (Adult and Pediatric)  Goal: *Acute Goals and Plan of Care (Insert Text)  LTG:  (1.)Ms. Vieira will move from supine to sit and sit to supine , scoot up and down and roll side to side with MODERATE ASSIST within 7 treatment day(s). (2.)Ms. Vieira will transfer from bed to chair and chair to bed with MAXIMAL ASSIST using the least restrictive device within 7 treatment day(s). (3.)Ms. Vieira will ambulate with MAXIMAL ASSIST for 5 feet with the least restrictive device within 7 treatment day(s). ________________________________________________________________________________________________    PHYSICAL THERAPY: Initial Assessment, AM 12/12/2018  INPATIENT: Hospital Day: 3  [place] / Product Type: Medicare /      NAME/AGE/GENDER: Nathaniel Vieira is a 80 y.o. female   PRIMARY DIAGNOSIS: Sepsis ([facility] [address] Sepsis ([facility] [address] Sepsis ([facility] [address]       ICD-10: Treatment Diagnosis:    · Generalized Muscle Weakness (M62.81)  · Difficulty in walking, Not elsewhere classified (R26.2)  · Other abnormalities of gait and mobility (R26.89)   Precaution/Allergies:  Patient has no known allergies. ASSESSMENT:     Ms. Mitzi Acosta  Is an 80year old female admitted from home for skilled nursing facility for sepsis. She is drowsy and confused this morning but is agreeable to therapy assessment with encouragement. Unable to provide much history regarding prior level of function. States she needs assist of to for transfers. Demonstrates decreased AROM and strength in LEs and generally. Pt requires max-total assist for bed mobility and transfer to sitting. Poor seated balance noted in sitting with right trunk lean and forward flexed posture. Due to weakness, cognition, and poor balance, standing not attempted today. Max-total A to return to supine. Positioned comfortably with needs in reach and lunch set up.  Marta DE LA FUENTE Isha is likely functioning slightly below baseline with strength and mobility and will benefit from cotninued therapy to maximize safety/independence with mobility. Anticipate return to SNF [place] possibly with rehab pending progress and determination of PLOF. This section established at most recent assessment   PROBLEM LIST (Impairments causing functional limitations):  1. Decreased Strength  2. Decreased ADL/Functional Activities  3. Decreased Transfer Abilities  4. Decreased Ambulation Ability/Technique  5. Decreased Balance  6. Decreased Activity Tolerance  7. Decreased Cognition   INTERVENTIONS PLANNED: (Benefits and precautions of physical therapy have been discussed with the patient.)  1. Balance Exercise  2. Bed Mobility  3. Gait Training  4. Home Exercise Program (HEP)  5. Therapeutic Activites  6. Therapeutic Exercise/Strengthening  7. Transfer Training     TREATMENT PLAN: Frequency/Duration: 2-3 times a week for duration of hospital stay  Rehabilitation Potential For Stated Goals: Good     RECOMMENDED REHABILITATION/EQUIPMENT: (at time of discharge pending progress): Due to the probability of continued deficits (see above) this patient will likely need continued skilled physical therapy after discharge. Equipment:    tbd, likely none              HISTORY:   History of Present Injury/Illness (Reason for Referral):  Per H&P, \"Marta Vieira is an 81 yo F long-term resident at [facility] with history of debility, recent L humeral neck fracture on 9/28, hypothyroidism, who presents to the ED with fever. She reports she started feeling poorly about 4 days ago and developed a dry cough. Then, she developed nausea (few episodes of vomiting) and diarrhea the next day. She has had some slight abdominal discomfort. She reports mild shortness of breath now. She denies any chest pain. Today, she was noted to have a fever of 103 and coughing more with wheezing (according to paperwork from SNF).   She was brought to the ED via EMS and given zosyn en route. In ED: WBC 14.3, lactic acid 2.75-->1.28 with IVF bolus, UA with trace bacteria and leuks, tachy with heart rate 125 (sinus tach on EKG), influenza negative. CT abdomen/pelvis showed no significant intra-abdominal pathology, but did show bibasilar airspace disease. \"    Past Medical History/Comorbidities:   Ms. Abe Roque  has a past medical history of Arthritis, Headache(784.0), Hypertension, Ill-defined condition, Ill-defined condition, Psychiatric disorder, and Thyroid disease. Ms. Abe Roque  has a past surgical history that includes pr cardiac surg procedure unlist; hx cholecystectomy; and hx orthopaedic. Social History/Living Environment:   Home Environment: [address] [place] [address] Name: [employer]  One/Two Story Residence: One story  Living Alone: No  Support Systems: Skilled nursing facility  [facility] [address] [place] [address]  Current DME Used/Available at Home: Wheelchair  Tub or Shower Type: Shower  Prior Level of Function/Work/Activity:  Pt from long term care facility/SNF. She is unable to provide history due to cognition. She reports needing assist of 2 for transfer. Number of Personal Factors/Comorbidities that affect the Plan of Care: 1-2: MODERATE COMPLEXITY   EXAMINATION:   Most Recent Physical Functioning:   Gross Assessment:  AROM: Generally decreased, functional  Strength: Grossly decreased, non-functional  Coordination: Generally decreased, functional               Posture:  Posture (WDL): Exceptions to WDL  Posture Assessment: Forward head, Rounded shoulders  Balance:  Sitting: Impaired  Sitting - Static: Poor (constant support)  Sitting - Dynamic: Poor (constant support) Bed Mobility:  Rolling: Maximum assistance; Total assistance  Supine to Sit: Maximum assistance; Total assistance  Sit to Supine: Total assistance  Scooting: Maximum assistance; Total assistance  Wheelchair Mobility:     Transfers:  Sit to Stand: (unable)  Gait: Body Structures Involved:  1. Muscles Body Functions Affected:  1. Mental  2. Movement Related Activities and Participation Affected:  1. General Tasks and Demands  2. Mobility  3. Domestic Life  4. Community, Social and [place]   Number of elements that affect the Plan of Care: 4+: HIGH COMPLEXITY   CLINICAL PRESENTATION:   Presentation: Evolving clinical presentation with changing clinical characteristics: MODERATE COMPLEXITY   CLINICAL DECISION MAKIN [facility] Mobility Inpatient Short Form  How much difficulty does the patient currently have. .. Unable A Lot A Little None   1. Turning over in bed (including adjusting bedclothes, sheets and blankets)? [] 1   [x] 2   [] 3   [] 4   2. Sitting down on and standing up from a chair with arms ( e.g., wheelchair, bedside commode, etc.)   [x] 1   [] 2   [] 3   [] 4   3. Moving from lying on back to sitting on the side of the bed? [] 1   [x] 2   [] 3   [] 4   How much help from another person does the patient currently need. .. Total A Lot A Little None   4. Moving to and from a bed to a chair (including a wheelchair)? [x] 1   [] 2   [] 3   [] 4   5. Need to walk in hospital room? [x] 1   [] 2   [] 3   [] 4   6. Climbing 3-5 steps with a railing? [x] 1   [] 2   [] 3   [] 4   © , Trustees of [facility], under license to [employer]. All rights reserved      Score:  Initial: 8 Most Recent: X (Date: -- )    Interpretation of Tool:  Represents activities that are increasingly more difficult (i.e. Bed mobility, Transfers, Gait). Score 24 23 22-20 19-15 14-10 9-7 6     Modifier CH CI CJ CK CL CM CN      ?  Mobility - Walking and Moving Around:     - CURRENT STATUS: CM - 80%-99% impaired, limited or restricted    - GOAL STATUS: CL - 60%-79% impaired, limited or restricted    - D/C STATUS:  ---------------To be determined---------------  [place] / Product Type: Medicare /      Medical Necessity:     · Patient demonstrates fair rehab potential due to higher previous functional level. Reason for Services/Other Comments:  · Patient continues to demonstrate capacity to improve strength, mobility which will increase independence, decrease amount of assistance required from caregiver and increase safety. Use of outcome tool(s) and clinical judgement create a POC that gives a: Questionable prediction of patient's progress: MODERATE COMPLEXITY            TREATMENT:   (In addition to Assessment/Re-Assessment sessions the following treatments were rendered)   Pre-treatment Symptoms/Complaints:  \"thank you\"  Pain: Initial:   Pain Intensity 1: 0  Post Session:  0/10     Assessment/Reassessment only, no treatment provided today    Braces/Orthotics/Lines/Etc:   · IV  · O2 Device: Nasal cannula  Treatment/Session Assessment:    · Response to Treatment:  Pt requires max assist for bed mobility. Weak, drowsy  · Interdisciplinary Collaboration:   o Physical Therapist  o Registered Nurse  · After treatment position/precautions:   o Supine in bed  o Bed/Chair-wheels locked  o Bed in low position  o Call light within reach   · Compliance with Program/Exercises: Will assess as treatment progresses  · Recommendations/Intent for next treatment session: \"Next visit will focus on advancements to more challenging activities and reduction in assistance provided\".   Total Treatment Duration:  PT Patient Time In/Time Out  Time In: 1124  Time Out: Petra Ralph 148, DPT

## 2018-12-12 NOTE — PROGRESS NOTES
Hospitalist Progress Note     Admit Date:  12/10/2018 12:12 PM   Name:  Lynn Harris   Age:  80 y.o.  :  1930   MRN:  [id number]   PCP:  Unknown, Provider  Treatment Team: Attending Provider: Marito Epps MD; Utilization Review: Min Soriano RN; Care Manager: Julianna Edwards RN    Subjective:     From H and P HPI:  Ran Aguirre is an 81 yo F long-term resident at [facility] with history of debility, recent L humeral neck fracture on , hypothyroidism, who presents to the ED with fever.  She reports she started feeling poorly about 4 days ago and developed a dry cough.  Then, she developed nausea (few episodes of vomiting) and diarrhea the next day. Suzanne Durham has had some slight abdominal discomfort.  She reports mild shortness of breath now. Suzanne Durham denies any chest pain.    Today, she was noted to have a fever of 103 and coughing more with wheezing (according to paperwork from SNF).  She was brought to the ED via EMS and given zosyn en route. In ED: WBC 14.3, lactic acid 2.75-->1.28 with IVF bolus, UA with trace bacteria and leuks, tachy with heart rate 125 (sinus tach on EKG), influenza negative. CT abdomen/pelvis showed no significant intra-abdominal pathology, but did show bibasilar airspace disease.     Hospitalist service asked to admit for sepsis     18:  No gross distress or new complaints. Denies dyspnea at rest unless coughing. 18:  No new complaints. More alert today denies new complaints. Denies significant productive cough.                Objective:     Patient Vitals for the past 24 hrs:   Temp Pulse Resp BP SpO2   18 0741     96 %   18 0730 98.4 °F (36.9 °C) 94 20 138/58 96 %   18 0456 98.5 °F (36.9 °C) 99 18 149/75 95 %   18 0006    132/59    18 2356 98.8 °F (37.1 °C) 89 18 138/49 96 %   18 1905 99.8 °F (37.7 °C) 98 20 (!) 134/97 96 %   18 1542 98.8 °F (37.1 °C) 91 19 132/71 91 %   18 1214 98 °F (36.7 °C) 83 18 115/55 97 %     Oxygen Therapy  O2 Sat (%): 96 % (12/12/18 0741)  Pulse via Oximetry: 84 beats per minute (12/12/18 0741)  O2 Device: Nasal cannula (12/12/18 0741)  O2 Flow Rate (L/min): 2 l/min (12/12/18 0741)    Intake/Output Summary (Last 24 hours) at 12/12/2018 0808  Last data filed at 12/11/2018 1837  Gross per 24 hour   Intake 2231 ml   Output 1 ml   Net 2230 ml         Physical Examination:        General:          Alert, cooperative, no distress, obese. Head:               Normocephalic, without obvious abnormality, atraumatic. Nose:               Nares normal. No drainage or sinus tenderness. Lungs:             faint rales at bases bilateral  Heart:              rrr no s3 no rub no jvd  Abdomen:        Slight lower abdominal tenderness, not distended, + BS  Extremities:     No cyanosis. No edema. No clubbing  Skin:                Texture, turgor normal. No rashes or lesions. Not Jaundiced  Neurologic:      Alert and oriented x 3, no focal deficits       Data Review:  I have reviewed all labs, meds, telemetry events, and studies from the last 24 hours.     Recent Results (from the past 24 hour(s))   METABOLIC PANEL, BASIC    Collection Time: 12/12/18  5:57 AM   Result Value Ref Range    Sodium 145 136 - 145 mmol/L    Potassium 4.2 3.5 - 5.1 mmol/L    Chloride 112 (H) 98 - 107 mmol/L    CO2 24 21 - 32 mmol/L    Anion gap 9 7 - 16 mmol/L    Glucose 104 (H) 65 - 100 mg/dL    BUN 19 8 - 23 MG/DL    Creatinine 1.18 (H) 0.6 - 1.0 MG/DL    GFR est AA 56 (L) >60 ml/min/1.73m2    GFR est non-AA 46 (L) >60 ml/min/1.73m2    Calcium 10.5 (H) 8.3 - 10.4 MG/DL   CBC WITH AUTOMATED DIFF    Collection Time: 12/12/18  5:57 AM   Result Value Ref Range    WBC 11.3 (H) 4.3 - 11.1 K/uL    RBC 3.48 (L) 4.05 - 5.2 M/uL    HGB 10.3 (L) 11.7 - 15.4 g/dL    HCT 36.2 35.8 - 46.3 %    .0 (H) 79.6 - 97.8 FL    MCH 29.6 26.1 - 32.9 PG    MCHC 28.5 (L) 31.4 - 35.0 g/dL    RDW 14.8 (H) 11.9 - 14.6 % PLATELET 634 936 - 958 K/uL    MPV 10.6 9.4 - 12.3 FL    ABSOLUTE NRBC 0.00 0.0 - 0.2 K/uL    DF AUTOMATED      NEUTROPHILS 68 43 - 78 %    LYMPHOCYTES 18 13 - 44 %    MONOCYTES 8 4.0 - 12.0 %    EOSINOPHILS 2 0.5 - 7.8 %    BASOPHILS 1 0.0 - 2.0 %    IMMATURE GRANULOCYTES 4 0.0 - 5.0 %    ABS. NEUTROPHILS 7.6 1.7 - 8.2 K/UL    ABS. LYMPHOCYTES 2.0 0.5 - 4.6 K/UL    ABS. MONOCYTES 0.9 0.1 - 1.3 K/UL    ABS. EOSINOPHILS 0.2 0.0 - 0.8 K/UL    ABS. BASOPHILS 0.1 0.0 - 0.2 K/UL    ABS. IMM. GRANS. 0.5 0.0 - 0.5 K/UL        All Micro Results     Procedure Component Value Units Date/Time    CULTURE, URINE [[id number]] Collected:  12/10/18 1328    Order Status:  Completed Specimen:  Cath Urine Updated:  12/12/18 0755     Special Requests: NO SPECIAL REQUESTS        Culture result: NO GROWTH 1 DAY       CULTURE, BLOOD [[id number]] Collected:  12/10/18 1224    Order Status:  Completed Specimen:  Blood Updated:  12/12/18 0725     Special Requests: --        LEFT  Antecubital       Culture result: NO GROWTH 2 DAYS       CULTURE, BLOOD [[id number]] Collected:  12/10/18 1237    Order Status:  Completed Specimen:  Blood Updated:  12/12/18 0725     Special Requests: --        RIGHT  Antecubital       Culture result: NO GROWTH 2 DAYS       CULTURE, STOOL [[id number]] Collected:  12/11/18 1800    Order Status:  Completed Specimen:  Stool Updated:  12/11/18 2343    C. DIFFICILE AG & TOXIN A/B [[id number]] Collected:  12/11/18 1800    Order Status:  Completed Specimen:  Stool Updated:  12/11/18 2023    CULTURE, RESPIRATORY/SPUTUM/BRONCH Paco Mellow STAIN [[id number]]     Order Status:  Sent Specimen:  Sputum     INFLUENZA A & B AG (RAPID TEST) [[id number]] Collected:  12/10/18 1329    Order Status:  Completed Specimen:  Nasopharyngeal from Nasal washing Updated:  12/10/18 1359     Influenza A Ag NEGATIVE         Comment: NEGATIVE FOR THE PRESENCE OF INFLUENZA A ANTIGEN  INFECTION DUE TO INFLUENZA A CANNOT BE RULED OUT.   BECAUSE THE ANTIGEN PRESENT IN THE SAMPLE MAY BE BELOW  THE DETECTION LIMIT OF THE TEST. A NEGATIVE TEST IS PRESUMPTIVE AND IT IS RECOMMENDED THAT THESE RESULTS BE CONFIRMED BY VIRAL CULTURE OR AN FDA-CLEARED INFLUENZA A AND B MOLECULAR ASSAY. Influenza B Ag NEGATIVE         Comment: NEGATIVE FOR THE PRESENCE OF INFLUENZA B ANTIGEN  INFECTION DUE TO INFLUENZA B CANNOT BE RULED OUT. BECAUSE THE ANTIGEN PRESENT IN THE SAMPLE MAY BE BELOW  THE DETECTION LIMIT OF THE TEST. A NEGATIVE TEST IS PRESUMPTIVE AND IT IS RECOMMENDED THAT THESE RESULTS BE CONFIRMED BY VIRAL CULTURE OR AN FDA-CLEARED INFLUENZA A AND B MOLECULAR ASSAY. Source NASOPHARYNGEAL             Current Meds:  Current Facility-Administered Medications   Medication Dose Route Frequency    enoxaparin (LOVENOX) injection 40 mg  40 mg SubCUTAneous Q24H    sodium chloride (NS) flush 5-10 mL  5-10 mL IntraVENous PRN    acetaminophen (TYLENOL) tablet 1,000 mg  1,000 mg Oral TID    aspirin delayed-release tablet 81 mg  81 mg Oral DAILY    cyanocobalamin tablet 1,000 mcg  1,000 mcg Oral DAILY    fluticasone (FLONASE) 50 mcg/actuation nasal spray 1 Spray  1 Spray Both Nostrils QHS    gabapentin (NEURONTIN) capsule 300 mg  300 mg Oral TID    levothyroxine (SYNTHROID) tablet 75 mcg  75 mcg Oral ACB    loratadine (CLARITIN) tablet 10 mg  10 mg Oral DAILY    sodium chloride (NS) flush 5-10 mL  5-10 mL IntraVENous Q8H    piperacillin-tazobactam (ZOSYN) 4.5 g in 0.9% sodium chloride (MBP/ADV) 100 mL  4.5 g IntraVENous Q8H    ondansetron (ZOFRAN) injection 4 mg  4 mg IntraVENous Q6H PRN    albuterol (PROVENTIL VENTOLIN) nebulizer solution 2.5 mg  2.5 mg Nebulization Q6H PRN    alcohol 62% (NOZIN) nasal  1 Ampule  1 Ampule Topical Q12H    vancomycin (VANCOCIN) 1,000 mg in 0.9% sodium chloride (MBP/ADV) 250 mL  1,000 mg IntraVENous Q24H       Diet:  DIET REGULAR    Other Studies (last 24 hours):  No results found.     Assessment and Plan:     Hospital Problems as of 12/12/2018 Date Reviewed: 9/7/2016          Codes Class Noted - Resolved POA    * (Principal) Sepsis ([facility] [address] ICD-10-CM: A41.9  ICD-9-CM: 038.9, 995.91  12/10/2018 - Present Yes        HCAP (healthcare-associated pneumonia) ICD-10-CM: J18.9  ICD-9-CM: 486  12/10/2018 - Present Yes        Non-intractable vomiting with nausea ICD-10-CM: R11.2  ICD-9-CM: 787.01  12/10/2018 - Present Yes        Diarrhea of presumed infectious origin ICD-10-CM: R19.7  ICD-9-CM: 009.3  12/10/2018 - Present Yes        CKD (chronic kidney disease) stage 3, GFR 30-59 ml/min ([facility]) ICD-10-CM: N18.3  ICD-9-CM: 585.3  2/19/2017 - Present Yes              A/P:      · Sepsis from suspected HCAP (due to residing in SNF)-   § Zosyn and vancomycin-  § Cultures ngtd. Continue to Wean oxygen as able     · Nausea/vomiting/diarrhea--resolved - stool studies collected not resulted yet  § Continue to Hold Senna and colace; resume if constipation     · Hypothyroidism- home med continued     · Debility- OOB to chair prn.      bed hold at [facility].  Hopeful transfer back next 48hr.      ADDENDUM:   STOOL POSITIVE RESULT FOR C.DIF. -- STOP ZOSYN AND ADD QUINOLONE AND FLAGYL,CONTINUE IV VANCOMYCIN FOR HCAP.     Code Status: DNR    Surrogate decision maker- Warren Frias (daughter)-

## 2018-12-12 NOTE — PROGRESS NOTES
Interdisciplinary Rounds completed 12/12/18. Nursing, Case Management, Physician and PT present. Plan of care reviewed and updated. Positive for C-diff. [facility] at d/c.

## 2018-12-12 NOTE — PROGRESS NOTES
Hourly rounds complete this shift, no new complaints at this time, bed in low, locked position, call light and bedside table within reach,  all needs met. Will continue to monitor Report to day shift nurse.

## 2018-12-13 LAB
ANION GAP SERPL CALC-SCNC: 5 MMOL/L (ref 7–16)
BACTERIA SPEC CULT: NORMAL
BUN SERPL-MCNC: 19 MG/DL (ref 8–23)
CALCIUM SERPL-MCNC: 10.9 MG/DL (ref 8.3–10.4)
CHLORIDE SERPL-SCNC: 111 MMOL/L (ref 98–107)
CO2 SERPL-SCNC: 29 MMOL/L (ref 21–32)
CREAT SERPL-MCNC: 1.14 MG/DL (ref 0.6–1)
DIFFERENTIAL METHOD BLD: ABNORMAL
EOSINOPHIL # BLD: 0.2 K/UL (ref 0–0.8)
EOSINOPHIL NFR BLD MANUAL: 2 % (ref 1–8)
ERYTHROCYTE [DISTWIDTH] IN BLOOD BY AUTOMATED COUNT: 14.6 % (ref 11.9–14.6)
GLUCOSE SERPL-MCNC: 109 MG/DL (ref 65–100)
HCT VFR BLD AUTO: 37.1 % (ref 35.8–46.3)
HGB BLD-MCNC: 10.8 G/DL (ref 11.7–15.4)
LYMPHOCYTES # BLD: 2.5 K/UL (ref 0.5–4.6)
LYMPHOCYTES NFR BLD MANUAL: 22 % (ref 16–44)
MCH RBC QN AUTO: 30.3 PG (ref 26.1–32.9)
MCHC RBC AUTO-ENTMCNC: 29.1 G/DL (ref 31.4–35)
MCV RBC AUTO: 103.9 FL (ref 79.6–97.8)
METAMYELOCYTES NFR BLD MANUAL: 1 %
MONOCYTES # BLD: 0.9 K/UL (ref 0.1–1.3)
MONOCYTES NFR BLD MANUAL: 8 % (ref 3–9)
MYELOCYTES NFR BLD MANUAL: 2 %
NEUTS SEG # BLD: 7.9 K/UL (ref 1.7–8.2)
NEUTS SEG NFR BLD MANUAL: 65 % (ref 47–75)
NRBC # BLD: 0 K/UL (ref 0–0.2)
PLATELET # BLD AUTO: 201 K/UL (ref 150–450)
PLATELET COMMENTS,PCOM: ADEQUATE
PMV BLD AUTO: 10.2 FL (ref 9.4–12.3)
POTASSIUM SERPL-SCNC: 4 MMOL/L (ref 3.5–5.1)
RBC # BLD AUTO: 3.57 M/UL (ref 4.05–5.2)
RBC MORPH BLD: ABNORMAL
SERVICE CMNT-IMP: NORMAL
SODIUM SERPL-SCNC: 145 MMOL/L (ref 136–145)
WBC # BLD AUTO: 11.5 K/UL (ref 4.3–11.1)

## 2018-12-13 PROCEDURE — 74011250636 HC RX REV CODE- 250/636: Performed by: INTERNAL MEDICINE

## 2018-12-13 PROCEDURE — 74011250637 HC RX REV CODE- 250/637: Performed by: INTERNAL MEDICINE

## 2018-12-13 PROCEDURE — 65270000029 HC RM PRIVATE

## 2018-12-13 PROCEDURE — 97530 THERAPEUTIC ACTIVITIES: CPT

## 2018-12-13 PROCEDURE — 85025 COMPLETE CBC W/AUTO DIFF WBC: CPT

## 2018-12-13 PROCEDURE — 74011000258 HC RX REV CODE- 258: Performed by: INTERNAL MEDICINE

## 2018-12-13 PROCEDURE — 77010033678 HC OXYGEN DAILY

## 2018-12-13 PROCEDURE — 94760 N-INVAS EAR/PLS OXIMETRY 1: CPT

## 2018-12-13 PROCEDURE — 80048 BASIC METABOLIC PNL TOTAL CA: CPT

## 2018-12-13 PROCEDURE — 36415 COLL VENOUS BLD VENIPUNCTURE: CPT

## 2018-12-13 RX ORDER — VANCOMYCIN HYDROCHLORIDE
1250
Status: DISPENSED | OUTPATIENT
Start: 2018-12-13 | End: 2018-12-16

## 2018-12-13 RX ADMIN — Medication 1 AMPULE: at 00:32

## 2018-12-13 RX ADMIN — ENOXAPARIN SODIUM 40 MG: 40 INJECTION, SOLUTION INTRAVENOUS; SUBCUTANEOUS at 00:33

## 2018-12-13 RX ADMIN — GABAPENTIN 300 MG: 300 CAPSULE ORAL at 00:45

## 2018-12-13 RX ADMIN — Medication 10 ML: at 14:00

## 2018-12-13 RX ADMIN — ACETAMINOPHEN 1000 MG: 500 TABLET, FILM COATED ORAL at 10:10

## 2018-12-13 RX ADMIN — VANCOMYCIN HYDROCHLORIDE 125 MG: 1 INJECTION, POWDER, LYOPHILIZED, FOR SOLUTION INTRAVENOUS at 17:08

## 2018-12-13 RX ADMIN — VANCOMYCIN HYDROCHLORIDE 125 MG: 1 INJECTION, POWDER, LYOPHILIZED, FOR SOLUTION INTRAVENOUS at 06:00

## 2018-12-13 RX ADMIN — PIPERACILLIN SODIUM,TAZOBACTAM SODIUM 4.5 G: 4; .5 INJECTION, POWDER, FOR SOLUTION INTRAVENOUS at 17:07

## 2018-12-13 RX ADMIN — Medication 10 ML: at 22:00

## 2018-12-13 RX ADMIN — GABAPENTIN 300 MG: 300 CAPSULE ORAL at 10:10

## 2018-12-13 RX ADMIN — ACETAMINOPHEN 1000 MG: 500 TABLET, FILM COATED ORAL at 17:07

## 2018-12-13 RX ADMIN — VANCOMYCIN HYDROCHLORIDE 125 MG: 1 INJECTION, POWDER, LYOPHILIZED, FOR SOLUTION INTRAVENOUS at 12:00

## 2018-12-13 RX ADMIN — Medication 10 ML: at 00:47

## 2018-12-13 RX ADMIN — ASPIRIN 81 MG: 81 TABLET, COATED ORAL at 10:10

## 2018-12-13 RX ADMIN — Medication 1 AMPULE: at 21:50

## 2018-12-13 RX ADMIN — CYANOCOBALAMIN TAB 1000 MCG 1000 MCG: 1000 TAB at 10:10

## 2018-12-13 RX ADMIN — ACETAMINOPHEN 1000 MG: 500 TABLET, FILM COATED ORAL at 00:33

## 2018-12-13 RX ADMIN — Medication 1 AMPULE: at 10:10

## 2018-12-13 RX ADMIN — GABAPENTIN 300 MG: 300 CAPSULE ORAL at 17:07

## 2018-12-13 RX ADMIN — ACETAMINOPHEN 1000 MG: 500 TABLET, FILM COATED ORAL at 21:48

## 2018-12-13 RX ADMIN — ENOXAPARIN SODIUM 40 MG: 40 INJECTION, SOLUTION INTRAVENOUS; SUBCUTANEOUS at 21:51

## 2018-12-13 RX ADMIN — FLUTICASONE PROPIONATE 1 SPRAY: 50 SPRAY, METERED NASAL at 00:52

## 2018-12-13 RX ADMIN — GABAPENTIN 300 MG: 300 CAPSULE ORAL at 21:48

## 2018-12-13 RX ADMIN — VANCOMYCIN HYDROCHLORIDE 125 MG: 1 INJECTION, POWDER, LYOPHILIZED, FOR SOLUTION INTRAVENOUS at 00:32

## 2018-12-13 RX ADMIN — LORATADINE 10 MG: 10 TABLET ORAL at 10:10

## 2018-12-13 NOTE — PROGRESS NOTES
Problem: Self Care Deficits Care Plan (Adult)  Goal: *Acute Goals and Plan of Care (Insert Text)    1. Pt will complete functional transfers for ADLs with mod assist  2. Pt will complete grooming and hygiene with set up  3. Pt will demonstrate independence with HEP to promote increased BUE strength and functional use for ADLs  4. Pt will tolerate 23 minutes functional activity with 4 or fewer rest breaks to promote increased endurance for ADLs  5. Pt will complete bed mobility with min assistance in prep for ADLs    Timeframe: 7 days      OCCUPATIONAL THERAPY: Daily Note, Treatment Day: 1st and AM 12/13/2018  INPATIENT: Hospital Day: 4  [place] / [place] / Product Type: Medicare /      NAME/AGE/GENDER: Cassandra Vieira is a 80 y.o. female   PRIMARY DIAGNOSIS:  Sepsis ([facility] [address] Sepsis ([facility] [address] Sepsis ([facility] [address]       ICD-10: Treatment Diagnosis:    · Generalized Muscle Weakness (M62.81)   Precautions/Allergies:    falls Patient has no known allergies. ASSESSMENT:     Ms. Daniel Bird was admitted with the above, has a history of debility and L humerus fx in September 2018 and is a long time resident at [facility]. Pt completed bed mobility with max A. Pt required constant support to sit edge of bed. Min progress made. Continue POC. This section established at most recent assessment   PROBLEM LIST (Impairments causing functional limitations):  1. Decreased Strength  2. Decreased ADL/Functional Activities  3. Decreased Transfer Abilities  4. Decreased Balance  5. Decreased Activity Tolerance  6. Increased Fatigue  7. Increased Shortness of Breath   INTERVENTIONS PLANNED: (Benefits and precautions of occupational therapy have been discussed with the patient.)  1. Activities of daily living training  2. Adaptive equipment training  3. Balance training  4. Therapeutic activity  5.  Therapeutic exercise     TREATMENT PLAN: Frequency/Duration: Follow patient 3 times/ week to address above goals.  Rehabilitation Potential For Stated Goals: Fair     RECOMMENDED REHABILITATION/EQUIPMENT: (at time of discharge pending progress): Due to the probability of continued deficits (see above) this patient will likely need continued skilled occupational therapy after discharge. Equipment:    None at this time              OCCUPATIONAL PROFILE AND HISTORY:   History of Present Injury/Illness (Reason for Referral):  See H&P  Past Medical History/Comorbidities:   Ms. Cheryl Smith  has a past medical history of Arthritis, Headache(784.0), Hypertension, Ill-defined condition, Ill-defined condition, Psychiatric disorder, and Thyroid disease. Ms. Cheryl Smith  has a past surgical history that includes pr cardiac surg procedure unlist; hx cholecystectomy; and hx orthopaedic. Social History/Living Environment:   Home Environment: [address] [place] [address] Name: [place]  One/Two Story Residence: One story  Living Alone: No  Support Systems: Skilled nursing facility  Patient Expects to be Discharged to[de-identified] Skilled nursing facility  Current DME Used/Available at Home: Wheelchair  Tub or Shower Type: Shower  Prior Level of Function/Work/Activity:  Pt reports that she was WC bound, required assistance for all bathing, dressing, toileting, bed mobility, and transfers and was able to feed herself and complete hygiene with set up. Number of Personal Factors/Comorbidities that affect the Plan of Care: Expanded review of therapy/medical records (1-2):  MODERATE COMPLEXITY   ASSESSMENT OF OCCUPATIONAL PERFORMANCE[de-identified]   Activities of Daily Living:   Basic ADLs (From Assessment) Complex ADLs (From Assessment)   Feeding: Setup  Oral Facial Hygiene/Grooming: Minimum assistance  Bathing: Maximum assistance  Upper Body Dressing: Moderate assistance  Lower Body Dressing: Maximum assistance  Toileting: Maximum assistance Instrumental ADL  Meal Preparation: Total assistance  Homemaking:  Total assistance  Medication Management: Maximum assistance  Financial Management: Maximum assistance   Grooming/Bathing/Dressing Activities of Daily Living     Cognitive Retraining  Safety/Judgement: Fall prevention                       Bed/Mat Mobility  Rolling: Maximum assistance  Supine to Sit: Maximum assistance  Sit to Supine: Maximum assistance       Most Recent Physical Functioning:   Gross Assessment:                  Posture:  Posture (WDL): Exceptions to WDL  Posture Assessment: Forward head, Rounded shoulders  Balance:    Bed Mobility:  Rolling: Maximum assistance  Supine to Sit: Maximum assistance  Sit to Supine: Maximum assistance  Wheelchair Mobility:     Transfers:               Patient Vitals for the past 6 hrs:   BP SpO2 Pulse   12/13/18 0845 134/76 97 % 97   12/13/18 1145 142/62 96 % 94       Mental Status  Neurologic State: Alert  Orientation Level: Oriented to person  Cognition: Follows commands  Perception: Verbal, Tactile  Perseveration: Tactile cues provided, Verbal cues provided  Safety/Judgement: Fall prevention                          Physical Skills Involved:  1. Range of Motion  2. Balance  3. Strength  4. Activity Tolerance Cognitive Skills Affected (resulting in the inability to perform in a timely and safe manner):  1. Sustained Attention Psychosocial Skills Affected:  1. Habits/Routines  2. Environmental Adaptation   Number of elements that affect the Plan of Care: 3-5:  MODERATE COMPLEXITY   CLINICAL DECISION MAKING:   [employer] AM-PAC 6 Clicks   Daily Activity Inpatient Short Form  How much help from another person does the patient currently need. .. Total A Lot A Little None   1. Putting on and taking off regular lower body clothing? [x] 1   [] 2   [] 3   [] 4   2. Bathing (including washing, rinsing, drying)? [] 1   [x] 2   [] 3   [] 4   3. Toileting, which includes using toilet, bedpan or urinal?   [x] 1   [] 2   [] 3   [] 4   4. Putting on and taking off regular upper body clothing? [] 1   [x] 2   [] 3   [] 4   5. Taking care of personal grooming such as brushing teeth? [] 1   [] 2   [x] 3   [] 4   6. Eating meals? [] 1   [] 2   [x] 3   [] 4   © 2007, Trustees of [address] [place] [address] [address] [id number], under license to [employer]. All rights reserved      Score:  Initial: 12 Most Recent: X (Date: -- )    Interpretation of Tool:  Represents activities that are increasingly more difficult (i.e. Bed mobility, Transfers, Gait). Score 24 23 22-20 19-15 14-10 9-7 6     Modifier CH CI CJ CK CL CM CN      ? Self Care:     - CURRENT STATUS: CL - 60%-79% impaired, limited or restricted    - GOAL STATUS: CK - 40%-59% impaired, limited or restricted    - D/C STATUS:  ---------------To be determined---------------  [place] / [place] / Product Type: Medicare /      Medical Necessity:     · Patient is expected to demonstrate progress in strength, range of motion, balance and functional technique to increase independence with ADLs. Reason for Services/Other Comments:  · Patient continues to require skilled intervention due to decreased strength,endurance, ADLs, and functional performance from baseline. Use of outcome tool(s) and clinical judgement create a POC that gives a: LOW COMPLEXITY         TREATMENT:   (In addition to Assessment/Re-Assessment sessions the following treatments were rendered)     Pre-treatment Symptoms/Complaints:    Pain: Initial:   Pain Intensity 1: 0  Post Session:  0     Therapeutic Activity: (    23): Therapeutic activities including bed mobility to improve mobility, strength and balance. Required max verbal and tactile cues   to promote motor control of bilateral, upper extremity(s), lower extremity(s).          Braces/Orthotics/Lines/Etc:   · IV  · O2 Device: Nasal cannula  Treatment/Session Assessment:    · Response to Treatment:  Fatigue, SOB  · Interdisciplinary Collaboration:   o Certified Occupational Therapy Assistant  o Registered Nurse  · After treatment position/precautions:   o Supine in bed  o Bed/Chair-wheels locked  o Bed in low position  o Call light within reach  o RN notified  o Side rails x 3   · Compliance with Program/Exercises: Will assess as treatment progresses. · Recommendations/Intent for next treatment session: \"Next visit will focus on advancements to more challenging activities and reduction in assistance provided\".   Total Treatment Duration:  OT Patient Time In/Time Out  Time In: 1100  Time Out: [address]

## 2018-12-13 NOTE — PROGRESS NOTES
Hospitalist Progress Note     Admit Date:  12/10/2018 12:12 PM   Name:  Kelly Alfredo   Age:  80 y.o.  :  1930   MRN:  [id number]   PCP:  Unknown, Provider  Treatment Team: Attending Provider: Corrinne Lincoln., MD; Utilization Review: Pennie Jacome RN; Care Manager: Javi Tena RN    Subjective:   From H and P HPI:  Catrina Baldwin is an 79 yo F long-term resident at [facility] with history of debility, recent L humeral neck fracture on , hypothyroidism, who presents to the ED with fever.  She reports she started feeling poorly about 4 days ago and developed a dry cough.  Then, she developed nausea (few episodes of vomiting) and diarrhea the next day. Radha Triplett has had some slight abdominal discomfort.  She reports mild shortness of breath now. Radha Triplett denies any chest pain.    Today, she was noted to have a fever of 103 and coughing more with wheezing (according to paperwork from SNF).  She was brought to the ED via EMS and given zosyn en route. In ED: WBC 14.3, lactic acid 2.75-->1.28 with IVF bolus, UA with trace bacteria and leuks, tachy with heart rate 125 (sinus tach on EKG), influenza negative. CT abdomen/pelvis showed no significant intra-abdominal pathology, but did show bibasilar airspace disease.     Hospitalist service asked to admit for sepsis    :  No new complaints. Alert to person and intermittent aware in hospital. Short term memory absent. Cooperative. No reports of severe diarrhea. Started vancomycin oral  for c.diff. diarrhea. On zosyn and vancomycin for bibasilar pneumonia hcap on admit. Diarrhea/c.diff present on admission. HOPEFUL DISCHARGE            Objective:     Patient Vitals for the past 24 hrs:   Temp Pulse Resp BP SpO2   18 0845 98.5 °F (36.9 °C) 97 18 134/76 97 %   18 0234 98.3 °F (36.8 °C) 99 18 130/69 96 %   18 2356 99.6 °F (37.6 °C) (!) 106 18 153/73 95 %   12/12/18 2041 98.1 °F (36.7 °C) 95 18 136/71 94 %   18 1702 98.6 °F (37 °C) 94 18 123/46 94 %   12/12/18 1120 98.6 °F (37 °C) 90 20 132/65 95 %     Oxygen Therapy  O2 Sat (%): 97 % (12/13/18 0845)  Pulse via Oximetry: 84 beats per minute (12/12/18 0741)  O2 Device: Nasal cannula (12/12/18 1138)  O2 Flow Rate (L/min): 2 l/min (12/12/18 1138)  No intake or output data in the 24 hours ending 12/13/18 1021      Physical Examination:  Physical Exam   Constitutional: No distress. HENT:   [place]     Eyes: Conjunctivae and EOM are normal.   Neck: No JVD present. No thyromegaly present. Cardiovascular: Regular rhythm. Exam reveals no gallop. No murmur heard. Pulmonary/Chest: Breath sounds normal. No respiratory distress. She has no wheezes. Abdominal: Bowel sounds are normal. There is no tenderness. There is no guarding. Musculoskeletal: She exhibits no edema or deformity. Neurological: She is alert. ORIENTED PERSON ONLY   Skin: Skin is dry. No rash noted. She is not diaphoretic. Data Review:  I have reviewed all labs, meds, telemetry events, and studies from the last 24 hours.     Recent Results (from the past 24 hour(s))   VANCOMYCIN, TROUGH    Collection Time: 12/12/18  4:41 PM   Result Value Ref Range    Vancomycin,trough 6.8 5 - 20 ug/mL   METABOLIC PANEL, BASIC    Collection Time: 12/13/18  8:45 AM   Result Value Ref Range    Sodium 145 136 - 145 mmol/L    Potassium 4.0 3.5 - 5.1 mmol/L    Chloride 111 (H) 98 - 107 mmol/L    CO2 29 21 - 32 mmol/L    Anion gap 5 (L) 7 - 16 mmol/L    Glucose 109 (H) 65 - 100 mg/dL    BUN 19 8 - 23 MG/DL    Creatinine 1.14 (H) 0.6 - 1.0 MG/DL    GFR est AA 58 (L) >60 ml/min/1.73m2    GFR est non-AA 48 (L) >60 ml/min/1.73m2    Calcium 10.9 (H) 8.3 - 10.4 MG/DL   CBC WITH AUTOMATED DIFF    Collection Time: 12/13/18  8:45 AM   Result Value Ref Range    WBC 11.5 (H) 4.3 - 11.1 K/uL    RBC 3.57 (L) 4.05 - 5.2 M/uL    HGB 10.8 (L) 11.7 - 15.4 g/dL    HCT 37.1 35.8 - 46.3 %    .9 (H) 79.6 - 97.8 FL    MCH 30.3 26.1 - 32.9 PG    MCHC 29.1 (L) 31.4 - 35.0 g/dL    RDW 14.6 11.9 - 14.6 %    PLATELET 949 999 - 255 K/uL    MPV 10.2 9.4 - 12.3 FL    ABSOLUTE NRBC 0.00 0.0 - 0.2 K/uL    DF PENDING         All Micro Results     Procedure Component Value Units Date/Time    CULTURE, STOOL [[id number]] Collected:  12/11/18 1800    Order Status:  Completed Specimen:  Stool Updated:  12/13/18 1020     Special Requests: NO SPECIAL REQUESTS        Culture result:       NO GROWTH OF SALMONELLA OR SHIGELLA IS EVIDENT ON FIRST READING, FINAL REPORT TO FOLLOW AFTER FURTHER INCUBATION OF CULTURE          CULTURE, URINE [[id number]] Collected:  12/10/18 1328    Order Status:  Completed Specimen:  Cath Urine Updated:  12/13/18 0647     Special Requests: NO SPECIAL REQUESTS        Culture result: NO GROWTH 2 DAYS       C. DIFFICILE AG & TOXIN A/B [[id number]]  (Abnormal) Collected:  12/11/18 1800    Order Status:  Completed Specimen:  Stool Updated:  12/12/18 1229     [address] Ledbetter ANTIGEN       C. DIFFICILE GDH ANTIGEN-POSITIVE           C. difficile toxin       C. DIFFICILE TOXIN-NEGATIVE           PCR Reflex PCR-POSITIVE        Comment: RESULTS VERIFIED, PHONED TO AND READ BACK BY  Caleb Angeles RN @3678 ON 12/12/18 AK. INTERPRETATION       POSITIVE FOR TOXIGENIC C. DIFFICILE           Clinical Consideration       RESULT REFLECTS PRESENCE OF TOXIGENIC ORGANISM. IF PT IS SYMPTOMATIC (>3 UNFORMED STOOLS/24HOURS)THIS RESULT SUGGESTS INFECTION AND WARRANTS TREATMENT AS C. DIFFICILE INFECTION.           CULTURE, BLOOD [[id number]] Collected:  12/10/18 1224    Order Status:  Completed Specimen:  Blood Updated:  12/12/18 0725     Special Requests: --        LEFT  Antecubital       Culture result: NO GROWTH 2 DAYS       CULTURE, BLOOD [[id number]] Collected:  12/10/18 1237    Order Status:  Completed Specimen:  Blood Updated:  12/12/18 0725     Special Requests: --        RIGHT  Antecubital       Culture result: NO GROWTH 2 DAYS       CULTURE, RESPIRATORY/SPUTUM/BRONCH Cintia Men STAIN [[id number]] Collected:  12/10/18 1800    Order Status:  Canceled Specimen:  Sputum     INFLUENZA A & B AG (RAPID TEST) [[id number]] Collected:  12/10/18 1329    Order Status:  Completed Specimen:  Nasopharyngeal from Nasal washing Updated:  12/10/18 1353     Influenza A Ag NEGATIVE         Comment: NEGATIVE FOR THE PRESENCE OF INFLUENZA A ANTIGEN  INFECTION DUE TO INFLUENZA A CANNOT BE RULED OUT. BECAUSE THE ANTIGEN PRESENT IN THE SAMPLE MAY BE BELOW  THE DETECTION LIMIT OF THE TEST. A NEGATIVE TEST IS PRESUMPTIVE AND IT IS RECOMMENDED THAT THESE RESULTS BE CONFIRMED BY VIRAL CULTURE OR AN FDA-CLEARED INFLUENZA A AND B MOLECULAR ASSAY. Influenza B Ag NEGATIVE         Comment: NEGATIVE FOR THE PRESENCE OF INFLUENZA B ANTIGEN  INFECTION DUE TO INFLUENZA B CANNOT BE RULED OUT. BECAUSE THE ANTIGEN PRESENT IN THE SAMPLE MAY BE BELOW  THE DETECTION LIMIT OF THE TEST. A NEGATIVE TEST IS PRESUMPTIVE AND IT IS RECOMMENDED THAT THESE RESULTS BE CONFIRMED BY VIRAL CULTURE OR AN FDA-CLEARED INFLUENZA A AND B MOLECULAR ASSAY.           Source NASOPHARYNGEAL             Current Meds:  Current Facility-Administered Medications   Medication Dose Route Frequency    vancomycin 50 mg/mL oral solution (compounded) 125 mg  125 mg Oral Q6H    piperacillin-tazobactam (ZOSYN) 4.5 g in 0.9% sodium chloride (MBP/ADV) 100 mL  4.5 g IntraVENous Q8H    vancomycin (VANCOCIN) 1250 mg in  ml infusion  1,250 mg IntraVENous Q18H    enoxaparin (LOVENOX) injection 40 mg  40 mg SubCUTAneous Q24H    sodium chloride (NS) flush 5-10 mL  5-10 mL IntraVENous PRN    acetaminophen (TYLENOL) tablet 1,000 mg  1,000 mg Oral TID    aspirin delayed-release tablet 81 mg  81 mg Oral DAILY    cyanocobalamin tablet 1,000 mcg  1,000 mcg Oral DAILY    fluticasone (FLONASE) 50 mcg/actuation nasal spray 1 Spray  1 Spray Both Nostrils QHS    gabapentin (NEURONTIN) capsule 300 mg  300 mg Oral TID    levothyroxine (SYNTHROID) tablet 75 mcg  75 mcg Oral ACB    loratadine (CLARITIN) tablet 10 mg  10 mg Oral DAILY    sodium chloride (NS) flush 5-10 mL  5-10 mL IntraVENous Q8H    ondansetron (ZOFRAN) injection 4 mg  4 mg IntraVENous Q6H PRN    albuterol (PROVENTIL VENTOLIN) nebulizer solution 2.5 mg  2.5 mg Nebulization Q6H PRN    alcohol 62% (NOZIN) nasal  1 Ampule  1 Ampule Topical Q12H       Diet:  DIET REGULAR    Other Studies (last 24 hours):  No results found. Assessment and Plan:     Hospital Problems as of 12/13/2018 Date Reviewed: 9/7/2016          Codes Class Noted - Resolved POA    C. difficile colitis ICD-10-CM: A04.72  ICD-9-CM: 008.45  12/12/2018 - Present Yes        * (Principal) Sepsis ([facility] [address] ICD-10-CM: A41.9  ICD-9-CM: 038.9, 995.91  12/10/2018 - Present Yes        HCAP (healthcare-associated pneumonia) ICD-10-CM: J18.9  ICD-9-CM: 486  12/10/2018 - Present Yes        Non-intractable vomiting with nausea ICD-10-CM: R11.2  ICD-9-CM: 787.01  12/10/2018 - Present Yes        Diarrhea of presumed infectious origin ICD-10-CM: R19.7  ICD-9-CM: 009.3  12/10/2018 - Present Yes        CKD (chronic kidney disease) stage 3, GFR 30-59 ml/min ([facility]) ICD-10-CM: N18.3  ICD-9-CM: 585.3  2/19/2017 - Present Yes              A/P:    · Sepsis from suspected HCAP (due to residing in SNF)-   § Zosyn and vancomycin- FOR ANOTHER 24HR. § WEAN OXYGEN       · Clostridium diffic. Colitis-- 10 day oral vancomycin rx. ·   § Nausea/vomiting/diarrhea--improved. Monitor for constipation     · Hypothyroidism- home med continued     · Debility- very sedentary       bed hold at [facility]. Hopeful transfer back next 24hr. .           Code Status: DNR    Surrogate decision maker- Trista Vieira (daughter)-

## 2018-12-13 NOTE — PROGRESS NOTES
Interdisciplinary Rounds completed 12/13/18. Nursing, Case Management, Physician and PT present. Plan of care reviewed and updated.     Return to [facility] at d/c

## 2018-12-13 NOTE — PROGRESS NOTES
Pharmacokinetic Consult to Pharmacist    Roger Wild is a 80 y.o. female being treated for HAP with vancomycin and zosyn. Height: 5' 2\" (157.5 cm)  Weight: 83.5 kg (184 lb)  Lab Results   Component Value Date/Time    BUN 19 12/12/2018 05:57 AM    Creatinine 1.18 (H) 12/12/2018 05:57 AM    WBC 11.3 (H) 12/12/2018 05:57 AM    Procalcitonin 1.2 12/10/2018 12:24 PM    Lactic Acid (POC) 1.28 12/10/2018 03:26 PM      Estimated Creatinine Clearance: 33 mL/min (A) (based on SCr of 1.18 mg/dL (H)). Lab Results   Component Value Date/Time    Vancomycin,trough 6.8 12/12/2018 04:41 PM       Day 3 of vancomycin. Goal trough is 15-20. Trough is low. Increase vancomycin to 1250 mg IV q18h. Further levels will be ordered as clinically indicated. Pharmacy will continue to follow. Please call with any questions.     Thank you,  Jacque Jose, PharmD  Clinical Pharmacist  [phone]

## 2018-12-13 NOTE — PROGRESS NOTES
Problem: Mobility Impaired (Adult and Pediatric)  Goal: *Acute Goals and Plan of Care (Insert Text)  LTG:  (1.)Ms. Vieira will move from supine to sit and sit to supine , scoot up and down and roll side to side with MODERATE ASSIST within 7 treatment day(s). (2.)Ms. Vieira will transfer from bed to chair and chair to bed with MAXIMAL ASSIST using the least restrictive device within 7 treatment day(s). (3.)Ms. Vieira will ambulate with MAXIMAL ASSIST for 5 feet with the least restrictive device within 7 treatment day(s). ________________________________________________________________________________________________    PHYSICAL THERAPY: Daily Note, Treatment Day: 1st, PM 12/13/2018  INPATIENT: Hospital Day: 4  [place] / Product Type: Medicare /      NAME/AGE/GENDER: Oralia Cortez is a 80 y.o. female   PRIMARY DIAGNOSIS: Sepsis ([facility] [address] Sepsis ([facility] [address] Sepsis ([facility] [address]       ICD-10: Treatment Diagnosis:    · Generalized Muscle Weakness (M62.81)  · Difficulty in walking, Not elsewhere classified (R26.2)  · Other abnormalities of gait and mobility (R26.89)   Precaution/Allergies:  Patient has no known allergies. ASSESSMENT:     Ms. Sacha Shaffer  Is an 80year old female admitted from home for skilled nursing facility for sepsis. She is drowsy and confused  but is agreeable to therapy RX. Performed LE ex in supine with mod assist and cues to stay on task. Pt requires max assist for bed mobility and transfer to sitting. Poor seated balance noted with right trunk lean and forward flexed posture. Worked on balance. Due to weakness, cognition, and poor balance, standing not attempted again today. Max/total to return to supine. Rolled left to right and back to left with max a + additional time. Positioned comfortably with needs in reach and lunch set up.  Marta DE LA FUENTE Isha is likely functioning slightly below baseline with strength and mobility and will benefit from cotninued therapy to maximize safety/independence with mobility. Anticipate return to SNF at [facility] possibly with rehab pending progress and determination of PLOF. This section established at most recent assessment   PROBLEM LIST (Impairments causing functional limitations):  1. Decreased Strength  2. Decreased ADL/Functional Activities  3. Decreased Transfer Abilities  4. Decreased Ambulation Ability/Technique  5. Decreased Balance  6. Decreased Activity Tolerance  7. Decreased Cognition   INTERVENTIONS PLANNED: (Benefits and precautions of physical therapy have been discussed with the patient.)  1. Balance Exercise  2. Bed Mobility  3. Gait Training  4. Home Exercise Program (HEP)  5. Therapeutic Activites  6. Therapeutic Exercise/Strengthening  7. Transfer Training     TREATMENT PLAN: Frequency/Duration: 2-3 times a week for duration of hospital stay  Rehabilitation Potential For Stated Goals: Good     RECOMMENDED REHABILITATION/EQUIPMENT: (at time of discharge pending progress): Due to the probability of continued deficits (see above) this patient will likely need continued skilled physical therapy after discharge. Equipment:    tbd, likely none              HISTORY:   History of Present Injury/Illness (Reason for Referral):  Per H&P, \"Marta Vieira is an 79 yo F long-term resident at [facility] with history of debility, recent L humeral neck fracture on 9/28, hypothyroidism, who presents to the ED with fever. She reports she started feeling poorly about 4 days ago and developed a dry cough. Then, she developed nausea (few episodes of vomiting) and diarrhea the next day. She has had some slight abdominal discomfort. She reports mild shortness of breath now. She denies any chest pain. Today, she was noted to have a fever of 103 and coughing more with wheezing (according to paperwork from SNF). She was brought to the ED via EMS and given zosyn en route.   In ED: WBC 14.3, lactic acid 2.75-->1.28 with IVF bolus, UA with trace bacteria and leuks, tachy with heart rate 125 (sinus tach on EKG), influenza negative. CT abdomen/pelvis showed no significant intra-abdominal pathology, but did show bibasilar airspace disease. \"    Past Medical History/Comorbidities:   Ms. Junior Arreguin  has a past medical history of Arthritis, Headache(784.0), Hypertension, Ill-defined condition, Ill-defined condition, Psychiatric disorder, and Thyroid disease. Ms. Junior Arreguin  has a past surgical history that includes pr cardiac surg procedure unlist; hx cholecystectomy; and hx orthopaedic. Social History/Living Environment:   Home Environment: [address] [place] [address] Name: Anya  One/Two Story Residence: One story  Living Alone: No  Support Systems: Skilled nursing facility  Patient Expects to be Discharged to[facility] [address] [place] [address]  Current DME Used/Available at Home: Wheelchair  Tub or Shower Type: Shower  Prior Level of Function/Work/Activity:  Pt from long term care facility/SNF. She is unable to provide history due to cognition. She reports needing assist of 2 for transfer. Number of Personal Factors/Comorbidities that affect the Plan of Care: 1-2: MODERATE COMPLEXITY   EXAMINATION:   Most Recent Physical Functioning:   Gross Assessment:                  Posture:  Posture Assessment: Forward head, Rounded shoulders(leaning to right)  Balance:  Sitting: Impaired  Sitting - Static: Poor (constant support)  Sitting - Dynamic: Poor (constant support) Bed Mobility:  Rolling: Maximum assistance  Supine to Sit: Maximum assistance  Sit to Supine: Maximum assistance  Scooting: Total assistance  Wheelchair Mobility:     Transfers:     Gait:            Body Structures Involved:  1. Muscles Body Functions Affected:  1. Mental  2. Movement Related Activities and Participation Affected:  1. General Tasks and Demands  2. Mobility  3. Domestic Life  4.  Community, Social and [place]   Number of elements that affect the Plan of Care: 4+: HIGH COMPLEXITY   CLINICAL PRESENTATION:   Presentation: Evolving clinical presentation with changing clinical characteristics: MODERATE COMPLEXITY   CLINICAL DECISION MAKIN [facility] Mobility Inpatient Short Form  How much difficulty does the patient currently have. .. Unable A Lot A Little None   1. Turning over in bed (including adjusting bedclothes, sheets and blankets)? [] 1   [x] 2   [] 3   [] 4   2. Sitting down on and standing up from a chair with arms ( e.g., wheelchair, bedside commode, etc.)   [x] 1   [] 2   [] 3   [] 4   3. Moving from lying on back to sitting on the side of the bed? [] 1   [x] 2   [] 3   [] 4   How much help from another person does the patient currently need. .. Total A Lot A Little None   4. Moving to and from a bed to a chair (including a wheelchair)? [x] 1   [] 2   [] 3   [] 4   5. Need to walk in hospital room? [x] 1   [] 2   [] 3   [] 4   6. Climbing 3-5 steps with a railing? [x] 1   [] 2   [] 3   [] 4   © , Trustees of [facility] [employer], under license to [employer]. All rights reserved      Score:  Initial: 8 Most Recent: X (Date: -- )    Interpretation of Tool:  Represents activities that are increasingly more difficult (i.e. Bed mobility, Transfers, Gait). Score 24 23 22-20 19-15 14-10 9-7 6     Modifier CH CI CJ CK CL CM CN      ? Mobility - Walking and Moving Around:     - CURRENT STATUS: CM - 80%-99% impaired, limited or restricted    - GOAL STATUS: CL - 60%-79% impaired, limited or restricted    - D/C STATUS:  ---------------To be determined---------------  [place] / Product Type: Medicare /      Medical Necessity:     · Patient demonstrates fair rehab potential due to higher previous functional level.   Reason for Services/Other Comments:  · Patient continues to demonstrate capacity to improve strength, mobility which will increase independence, decrease amount of assistance required from caregiver and increase safety. Use of outcome tool(s) and clinical judgement create a POC that gives a: Questionable prediction of patient's progress: MODERATE COMPLEXITY            TREATMENT:   (In addition to Assessment/Re-Assessment sessions the following treatments were rendered)   Pre-treatment Symptoms/Complaints: \"My stomach burns\". Pain: Initial:     6/10 Abdomen Post Session:  6/10     Therapeutic Activity: (    28 min): Therapeutic activities including LE ex, bed mobility, supine to sit transfers and sitting balance to improve mobility, strength and balance. Required mod    to promote dynamic balance in sitting. SUPINE Date:   Date:   Date:     Activity/Exercise Seated Parameters Parameters Parameters   AP's X 10 B A/ AA     Heel slides X 5 AA     SAQ's X 5 AA     Hip Flex      Hip ABD                            Braces/Orthotics/Lines/Etc:   · IV  · O2 Device: Nasal cannula 2L  Treatment/Session Assessment:    · Response to Treatment:  Pt requires max assist for bed mobility. · Interdisciplinary Collaboration:   o Physical Therapy Assistant  o Registered Nurse  · After treatment position/precautions:   o Supine in bed  o Bed/Chair-wheels locked  o Bed in low position  o Call light within reach   · Compliance with Program/Exercises: Will assess as treatment progresses  · Recommendations/Intent for next treatment session: \"Next visit will focus on advancements to more challenging activities and reduction in assistance provided\".   Total Treatment Duration:  PT Patient Time In/Time Out  Time In: 1342  Time Out: Zac Andrade PTA

## 2018-12-14 PROBLEM — A49.8 PSEUDOMONAS INFECTION: Status: ACTIVE | Noted: 2018-12-14

## 2018-12-14 LAB — VANCOMYCIN TROUGH SERPL-MCNC: 14.5 UG/ML (ref 5–20)

## 2018-12-14 PROCEDURE — 74011000258 HC RX REV CODE- 258: Performed by: INTERNAL MEDICINE

## 2018-12-14 PROCEDURE — 74011250637 HC RX REV CODE- 250/637: Performed by: INTERNAL MEDICINE

## 2018-12-14 PROCEDURE — 74011250636 HC RX REV CODE- 250/636: Performed by: INTERNAL MEDICINE

## 2018-12-14 PROCEDURE — 36415 COLL VENOUS BLD VENIPUNCTURE: CPT

## 2018-12-14 PROCEDURE — 80202 ASSAY OF VANCOMYCIN: CPT

## 2018-12-14 PROCEDURE — 65270000029 HC RM PRIVATE

## 2018-12-14 PROCEDURE — 76937 US GUIDE VASCULAR ACCESS: CPT

## 2018-12-14 RX ADMIN — ENOXAPARIN SODIUM 40 MG: 40 INJECTION, SOLUTION INTRAVENOUS; SUBCUTANEOUS at 21:54

## 2018-12-14 RX ADMIN — LEVOTHYROXINE SODIUM 75 MCG: 75 TABLET ORAL at 05:53

## 2018-12-14 RX ADMIN — FLUTICASONE PROPIONATE 1 SPRAY: 50 SPRAY, METERED NASAL at 22:00

## 2018-12-14 RX ADMIN — FLUTICASONE PROPIONATE 1 SPRAY: 50 SPRAY, METERED NASAL at 00:47

## 2018-12-14 RX ADMIN — Medication 1 AMPULE: at 09:38

## 2018-12-14 RX ADMIN — GABAPENTIN 300 MG: 300 CAPSULE ORAL at 09:39

## 2018-12-14 RX ADMIN — ASPIRIN 81 MG: 81 TABLET, COATED ORAL at 09:39

## 2018-12-14 RX ADMIN — ACETAMINOPHEN 1000 MG: 500 TABLET, FILM COATED ORAL at 18:16

## 2018-12-14 RX ADMIN — VANCOMYCIN HYDROCHLORIDE 125 MG: 1 INJECTION, POWDER, LYOPHILIZED, FOR SOLUTION INTRAVENOUS at 11:52

## 2018-12-14 RX ADMIN — VANCOMYCIN HYDROCHLORIDE 125 MG: 1 INJECTION, POWDER, LYOPHILIZED, FOR SOLUTION INTRAVENOUS at 18:00

## 2018-12-14 RX ADMIN — GABAPENTIN 300 MG: 300 CAPSULE ORAL at 21:54

## 2018-12-14 RX ADMIN — GABAPENTIN 300 MG: 300 CAPSULE ORAL at 18:16

## 2018-12-14 RX ADMIN — ACETAMINOPHEN 1000 MG: 500 TABLET, FILM COATED ORAL at 09:39

## 2018-12-14 RX ADMIN — PIPERACILLIN SODIUM,TAZOBACTAM SODIUM 4.5 G: 4; .5 INJECTION, POWDER, FOR SOLUTION INTRAVENOUS at 02:48

## 2018-12-14 RX ADMIN — VANCOMYCIN HYDROCHLORIDE 1250 MG: 10 INJECTION, POWDER, LYOPHILIZED, FOR SOLUTION INTRAVENOUS at 13:52

## 2018-12-14 RX ADMIN — VANCOMYCIN HYDROCHLORIDE 125 MG: 1 INJECTION, POWDER, LYOPHILIZED, FOR SOLUTION INTRAVENOUS at 00:45

## 2018-12-14 RX ADMIN — Medication 10 ML: at 05:54

## 2018-12-14 RX ADMIN — LORATADINE 10 MG: 10 TABLET ORAL at 09:39

## 2018-12-14 RX ADMIN — Medication 10 ML: at 00:48

## 2018-12-14 RX ADMIN — PIPERACILLIN SODIUM,TAZOBACTAM SODIUM 4.5 G: 4; .5 INJECTION, POWDER, FOR SOLUTION INTRAVENOUS at 21:55

## 2018-12-14 RX ADMIN — ACETAMINOPHEN 1000 MG: 500 TABLET, FILM COATED ORAL at 21:53

## 2018-12-14 RX ADMIN — CYANOCOBALAMIN TAB 1000 MCG 1000 MCG: 1000 TAB at 09:39

## 2018-12-14 RX ADMIN — PIPERACILLIN SODIUM,TAZOBACTAM SODIUM 4.5 G: 4; .5 INJECTION, POWDER, FOR SOLUTION INTRAVENOUS at 13:54

## 2018-12-14 RX ADMIN — Medication 1 AMPULE: at 21:54

## 2018-12-14 RX ADMIN — VANCOMYCIN HYDROCHLORIDE 125 MG: 1 INJECTION, POWDER, LYOPHILIZED, FOR SOLUTION INTRAVENOUS at 05:53

## 2018-12-14 RX ADMIN — Medication 10 ML: at 21:55

## 2018-12-14 NOTE — PROGRESS NOTES
Hospitalist Progress Note     Admit Date:  12/10/2018 12:12 PM   Name:  Ford Romero   Age:  80 y.o.  :  1930   MRN:  [id number]   PCP:  Unknown, Provider  Treatment Team: Attending Provider: Lexy Deleon MD; Utilization Review: Hugo Pfeiffer RN; Care Manager: Favio Dang RN    Subjective:     From H and P HPI:  Chris Lerma is an 81 yo F long-term resident at [facility] with history of debility, recent L humeral neck fracture on , hypothyroidism, who presents to the ED with fever.  She reports she started feeling poorly about 4 days ago and developed a dry cough.  Then, she developed nausea (few episodes of vomiting) and diarrhea the next day. Argelia Smoker has had some slight abdominal discomfort.  She reports mild shortness of breath now. Argelia Smoker denies any chest pain.    Today, she was noted to have a fever of 103 and coughing more with wheezing (according to paperwork from SNF).  She was brought to the ED via EMS and given zosyn en route. In ED: WBC 14.3, lactic acid 2.75-->1.28 with IVF bolus, UA with trace bacteria and leuks, tachy with heart rate 125 (sinus tach on EKG), influenza negative. CT abdomen/pelvis showed no significant intra-abdominal pathology, but did show bibasilar airspace disease.     Hospitalist service asked to admit for sepsis     18:  No new complaints. Alert to person and intermittent aware in hospital. Short term memory absent. Cooperative. No reports of severe diarrhea. Started vancomycin oral  for c.diff. diarrhea. On zosyn and vancomycin for bibasilar pneumonia hcap on admit. Diarrhea/c.diff present on admission. 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 She can not be more specific.              Objective:     Patient Vitals for the past 24 hrs:   Temp Pulse Resp BP SpO2   18 0827 98 °F (36.7 °C) 92 18 131/77 95 %   18 0333 98 °F (36.7 °C) 85 18 122/71 93 %   18 2313 99.8 °F (37.7 °C) (!) 105 18 159/77 96 %   12/13/18 1850 100 °F (37.8 °C) (!) 110 18 155/82 94 %   12/13/18 1612 99.6 °F (37.6 °C) (!) 103 18 189/88 97 %   12/13/18 1145 97.4 °F (36.3 °C) 94 17 142/62 96 %     Oxygen Therapy  O2 Sat (%): 95 % (12/14/18 0827)  Pulse via Oximetry: 84 beats per minute (12/12/18 0741)  O2 Device: Nasal cannula (12/12/18 1138)  O2 Flow Rate (L/min): 2 l/min (12/12/18 1138)    Intake/Output Summary (Last 24 hours) at 12/14/2018 1058  Last data filed at 12/14/2018 0844  Gross per 24 hour   Intake 683 ml   Output    Net 683 ml         Physical Examination:      Physical Exam   Constitutional: No distress. HENT:   Head: Atraumatic. Mouth/Throat: No oropharyngeal exudate. alopecia   Eyes: Pupils are equal, round, and reactive to light. Left eye exhibits no discharge. No scleral icterus. Neck: Normal range of motion. No JVD present. No tracheal deviation present. No thyromegaly present. Cardiovascular: Normal rate. Exam reveals no gallop and no friction rub. No murmur heard. Pulmonary/Chest: Effort normal. No respiratory distress. She has no wheezes. Abdominal: Bowel sounds are normal. There is tenderness. There is no rebound and no guarding. Musculoskeletal: She exhibits no tenderness or deformity. Neurological: She is alert. GCS score is 15. Skin: Skin is warm. No rash noted. She is not diaphoretic. Data Review:  I have reviewed all labs, meds, telemetry events, and studies from the last 24 hours.     Recent Results (from the past 24 hour(s))   Ruperto Nato    Collection Time: 12/14/18  5:32 AM   Result Value Ref Range    Vancomycin,trough 14.5 5 - 20 ug/mL        All Micro Results     Procedure Component Value Units Date/Time    CULTURE, STOOL [[id number]]  (Abnormal) Collected:  12/11/18 1800    Order Status:  Completed Specimen:  Stool Updated:  12/14/18 0902     Special Requests: NO SPECIAL REQUESTS        Culture result:       No Salmonella, Shigella, or Ecoli 0157 isolated. HEAVY PSEUDOMONAS SPECIES IDENTIFICATION AND SUSCEPTIBILITY TO FOLLOW                  CULTURE IN PROGRESS,FURTHER UPDATES TO FOLLOW          CULTURE, BLOOD [[id number]] Collected:  12/10/18 1224    Order Status:  Completed Specimen:  Blood Updated:  12/14/18 0733     Special Requests: --        LEFT  Antecubital       Culture result: NO GROWTH 4 DAYS       CULTURE, BLOOD [[id number]] Collected:  12/10/18 1237    Order Status:  Completed Specimen:  Blood Updated:  12/14/18 0733     Special Requests: --        RIGHT  Antecubital       Culture result: NO GROWTH 4 DAYS       CULTURE, URINE [[id number]] Collected:  12/10/18 1328    Order Status:  Completed Specimen:  Cath Urine Updated:  12/13/18 0647     Special Requests: NO SPECIAL REQUESTS        Culture result: NO GROWTH 2 DAYS       C. DIFFICILE AG & TOXIN A/B [[id number]]  (Abnormal) Collected:  12/11/18 1800    Order Status:  Completed Specimen:  Stool Updated:  12/12/18 1229     [address] ANTIGEN       C. DIFFICILE GDH ANTIGEN-POSITIVE           C. difficile toxin       C. DIFFICILE TOXIN-NEGATIVE           PCR Reflex PCR-POSITIVE        Comment: RESULTS VERIFIED, PHONED TO AND READ BACK BY  Dank Uriostegui RN @6123 ON 12/12/18 AK. INTERPRETATION       POSITIVE FOR TOXIGENIC C. DIFFICILE           Clinical Consideration       RESULT REFLECTS PRESENCE OF TOXIGENIC ORGANISM. IF PT IS SYMPTOMATIC (>3 UNFORMED STOOLS/24HOURS)THIS RESULT SUGGESTS INFECTION AND WARRANTS TREATMENT AS C. DIFFICILE INFECTION.           CULTURE, RESPIRATORY/SPUTUM/BRONCH José Leigh [[id number]] Collected:  12/10/18 1800    Order Status:  Canceled Specimen:  Sputum     INFLUENZA A & B AG (RAPID TEST) [[id number]] Collected:  12/10/18 1329    Order Status:  Completed Specimen:  Nasopharyngeal from Nasal washing Updated:  12/10/18 1359     Influenza A Ag NEGATIVE         Comment: NEGATIVE FOR THE PRESENCE OF INFLUENZA A ANTIGEN  INFECTION DUE TO INFLUENZA A CANNOT BE RULED OUT. BECAUSE THE ANTIGEN PRESENT IN THE SAMPLE MAY BE BELOW  THE DETECTION LIMIT OF THE TEST. A NEGATIVE TEST IS PRESUMPTIVE AND IT IS RECOMMENDED THAT THESE RESULTS BE CONFIRMED BY VIRAL CULTURE OR AN FDA-CLEARED INFLUENZA A AND B MOLECULAR ASSAY. Influenza B Ag NEGATIVE         Comment: NEGATIVE FOR THE PRESENCE OF INFLUENZA B ANTIGEN  INFECTION DUE TO INFLUENZA B CANNOT BE RULED OUT. BECAUSE THE ANTIGEN PRESENT IN THE SAMPLE MAY BE BELOW  THE DETECTION LIMIT OF THE TEST. A NEGATIVE TEST IS PRESUMPTIVE AND IT IS RECOMMENDED THAT THESE RESULTS BE CONFIRMED BY VIRAL CULTURE OR AN FDA-CLEARED INFLUENZA A AND B MOLECULAR ASSAY.           Source NASOPHARYNGEAL             Current Meds:  Current Facility-Administered Medications   Medication Dose Route Frequency    vancomycin (VANCOCIN) 1250 mg in  ml infusion  1,250 mg IntraVENous Q18H    vancomycin 50 mg/mL oral solution (compounded) 125 mg  125 mg Oral Q6H    piperacillin-tazobactam (ZOSYN) 4.5 g in 0.9% sodium chloride (MBP/ADV) 100 mL  4.5 g IntraVENous Q8H    enoxaparin (LOVENOX) injection 40 mg  40 mg SubCUTAneous Q24H    sodium chloride (NS) flush 5-10 mL  5-10 mL IntraVENous PRN    acetaminophen (TYLENOL) tablet 1,000 mg  1,000 mg Oral TID    aspirin delayed-release tablet 81 mg  81 mg Oral DAILY    cyanocobalamin tablet 1,000 mcg  1,000 mcg Oral DAILY    fluticasone (FLONASE) 50 mcg/actuation nasal spray 1 Spray  1 Spray Both Nostrils QHS    gabapentin (NEURONTIN) capsule 300 mg  300 mg Oral TID    levothyroxine (SYNTHROID) tablet 75 mcg  75 mcg Oral ACB    loratadine (CLARITIN) tablet 10 mg  10 mg Oral DAILY    sodium chloride (NS) flush 5-10 mL  5-10 mL IntraVENous Q8H    ondansetron (ZOFRAN) injection 4 mg  4 mg IntraVENous Q6H PRN    albuterol (PROVENTIL VENTOLIN) nebulizer solution 2.5 mg  2.5 mg Nebulization Q6H PRN    alcohol 62% (NOZIN) nasal  1 Ampule  1 Ampule Topical Q12H       Diet:  DIET REGULAR    Other Studies (last 24 hours):  No results found. Assessment and Plan:     Hospital Problems as of 12/14/2018 Date Reviewed: 9/7/2016          Codes Class Noted - Resolved POA    Pseudomonas infection ICD-10-CM: B96.5  ICD-9-CM: 041.7  12/14/2018 - Present Unknown        C. difficile colitis ICD-10-CM: A04.72  ICD-9-CM: 008.45  12/12/2018 - Present Yes        * (Principal) Sepsis ([address] ICD-10-CM: A41.9  ICD-9-CM: 038.9, 995.91  12/10/2018 - Present Yes        HCAP (healthcare-associated pneumonia) ICD-10-CM: J18.9  ICD-9-CM: 486  12/10/2018 - Present Yes        Non-intractable vomiting with nausea ICD-10-CM: R11.2  ICD-9-CM: 787.01  12/10/2018 - Present Yes        Diarrhea of presumed infectious origin ICD-10-CM: R19.7  ICD-9-CM: 009.3  12/10/2018 - Present Yes        CKD (chronic kidney disease) stage 3, GFR 30-59 ml/min ([facility]) ICD-10-CM: N18.3  ICD-9-CM: 585.3  2/19/2017 - Present Yes              A/P:    · Sepsis from suspected HCAP (due to residing in SNF)-   Bibasilar infiltrates suggested on ct abdomen-- however primary source infectious process may be   Intraabdominal - c.diff colitis and possible infectious diarrhea now with stool culture positive for pseudomonas   continue zosyn and vanc iv     · Clostridium diffic. Colitis-- 10 day oral vancomycin rx. ·    § Nausea/vomiting/diarrhea--improved.  Monitor for constipation     · Hypothyroidism- home med continued     · Debility- very sedentary   · Dementia       bed hold at [facility] with gi issues           Code Status: DNR    Surrogate decision maker- Trista Vieira (daughter)-

## 2018-12-15 LAB
ANION GAP SERPL CALC-SCNC: 8 MMOL/L (ref 7–16)
BACTERIA SPEC CULT: ABNORMAL
BACTERIA SPEC CULT: ABNORMAL
BACTERIA SPEC CULT: NORMAL
BACTERIA SPEC CULT: NORMAL
BASOPHILS # BLD: 0.1 K/UL (ref 0–0.2)
BASOPHILS NFR BLD: 1 % (ref 0–2)
BUN SERPL-MCNC: 20 MG/DL (ref 8–23)
CALCIUM SERPL-MCNC: 11 MG/DL (ref 8.3–10.4)
CHLORIDE SERPL-SCNC: 108 MMOL/L (ref 98–107)
CO2 SERPL-SCNC: 29 MMOL/L (ref 21–32)
CREAT SERPL-MCNC: 1.03 MG/DL (ref 0.6–1)
DIFFERENTIAL METHOD BLD: ABNORMAL
EOSINOPHIL # BLD: 0.3 K/UL (ref 0–0.8)
EOSINOPHIL NFR BLD: 3 % (ref 0.5–7.8)
ERYTHROCYTE [DISTWIDTH] IN BLOOD BY AUTOMATED COUNT: 14.7 % (ref 11.9–14.6)
GLUCOSE SERPL-MCNC: 101 MG/DL (ref 65–100)
HCT VFR BLD AUTO: 37.9 % (ref 35.8–46.3)
HGB BLD-MCNC: 10.8 G/DL (ref 11.7–15.4)
IMM GRANULOCYTES # BLD: 0.4 K/UL (ref 0–0.5)
IMM GRANULOCYTES NFR BLD AUTO: 4 % (ref 0–5)
LYMPHOCYTES # BLD: 1.9 K/UL (ref 0.5–4.6)
LYMPHOCYTES NFR BLD: 19 % (ref 13–44)
MCH RBC QN AUTO: 29.8 PG (ref 26.1–32.9)
MCHC RBC AUTO-ENTMCNC: 28.5 G/DL (ref 31.4–35)
MCV RBC AUTO: 104.7 FL (ref 79.6–97.8)
MONOCYTES # BLD: 0.6 K/UL (ref 0.1–1.3)
MONOCYTES NFR BLD: 6 % (ref 4–12)
NEUTS SEG # BLD: 6.7 K/UL (ref 1.7–8.2)
NEUTS SEG NFR BLD: 66 % (ref 43–78)
NRBC # BLD: 0 K/UL (ref 0–0.2)
PLATELET # BLD AUTO: 191 K/UL (ref 150–450)
PMV BLD AUTO: 10.1 FL (ref 9.4–12.3)
POTASSIUM SERPL-SCNC: 4.3 MMOL/L (ref 3.5–5.1)
RBC # BLD AUTO: 3.62 M/UL (ref 4.05–5.2)
SERVICE CMNT-IMP: ABNORMAL
SERVICE CMNT-IMP: NORMAL
SERVICE CMNT-IMP: NORMAL
SODIUM SERPL-SCNC: 145 MMOL/L (ref 136–145)
WBC # BLD AUTO: 10.1 K/UL (ref 4.3–11.1)

## 2018-12-15 PROCEDURE — 74011250636 HC RX REV CODE- 250/636: Performed by: INTERNAL MEDICINE

## 2018-12-15 PROCEDURE — 80048 BASIC METABOLIC PNL TOTAL CA: CPT

## 2018-12-15 PROCEDURE — 85025 COMPLETE CBC W/AUTO DIFF WBC: CPT

## 2018-12-15 PROCEDURE — 65270000029 HC RM PRIVATE

## 2018-12-15 PROCEDURE — 94760 N-INVAS EAR/PLS OXIMETRY 1: CPT

## 2018-12-15 PROCEDURE — 36415 COLL VENOUS BLD VENIPUNCTURE: CPT

## 2018-12-15 PROCEDURE — 74011250637 HC RX REV CODE- 250/637: Performed by: INTERNAL MEDICINE

## 2018-12-15 PROCEDURE — 77010033678 HC OXYGEN DAILY

## 2018-12-15 PROCEDURE — 74011000258 HC RX REV CODE- 258: Performed by: INTERNAL MEDICINE

## 2018-12-15 RX ORDER — PANTOPRAZOLE SODIUM 40 MG/1
40 TABLET, DELAYED RELEASE ORAL
Status: DISCONTINUED | OUTPATIENT
Start: 2018-12-16 | End: 2018-12-17 | Stop reason: HOSPADM

## 2018-12-15 RX ORDER — DOCUSATE SODIUM 100 MG/1
100 CAPSULE, LIQUID FILLED ORAL 2 TIMES DAILY
Status: DISCONTINUED | OUTPATIENT
Start: 2018-12-15 | End: 2018-12-17 | Stop reason: HOSPADM

## 2018-12-15 RX ADMIN — VANCOMYCIN HYDROCHLORIDE 125 MG: 1 INJECTION, POWDER, LYOPHILIZED, FOR SOLUTION INTRAVENOUS at 05:03

## 2018-12-15 RX ADMIN — FLUTICASONE PROPIONATE 1 SPRAY: 50 SPRAY, METERED NASAL at 21:42

## 2018-12-15 RX ADMIN — ACETAMINOPHEN 1000 MG: 500 TABLET, FILM COATED ORAL at 16:27

## 2018-12-15 RX ADMIN — GABAPENTIN 300 MG: 300 CAPSULE ORAL at 21:42

## 2018-12-15 RX ADMIN — VANCOMYCIN HYDROCHLORIDE 1250 MG: 10 INJECTION, POWDER, LYOPHILIZED, FOR SOLUTION INTRAVENOUS at 08:40

## 2018-12-15 RX ADMIN — ENOXAPARIN SODIUM 40 MG: 40 INJECTION, SOLUTION INTRAVENOUS; SUBCUTANEOUS at 21:42

## 2018-12-15 RX ADMIN — PIPERACILLIN SODIUM,TAZOBACTAM SODIUM 4.5 G: 4; .5 INJECTION, POWDER, FOR SOLUTION INTRAVENOUS at 21:43

## 2018-12-15 RX ADMIN — Medication 10 ML: at 14:00

## 2018-12-15 RX ADMIN — ACETAMINOPHEN 1000 MG: 500 TABLET, FILM COATED ORAL at 21:42

## 2018-12-15 RX ADMIN — CYANOCOBALAMIN TAB 1000 MCG 1000 MCG: 1000 TAB at 09:50

## 2018-12-15 RX ADMIN — Medication 10 ML: at 21:43

## 2018-12-15 RX ADMIN — VANCOMYCIN HYDROCHLORIDE 125 MG: 1 INJECTION, POWDER, LYOPHILIZED, FOR SOLUTION INTRAVENOUS at 18:00

## 2018-12-15 RX ADMIN — LORATADINE 10 MG: 10 TABLET ORAL at 09:50

## 2018-12-15 RX ADMIN — ASPIRIN 81 MG: 81 TABLET, COATED ORAL at 09:50

## 2018-12-15 RX ADMIN — VANCOMYCIN HYDROCHLORIDE 125 MG: 1 INJECTION, POWDER, LYOPHILIZED, FOR SOLUTION INTRAVENOUS at 00:00

## 2018-12-15 RX ADMIN — Medication 10 ML: at 05:02

## 2018-12-15 RX ADMIN — Medication 1 AMPULE: at 09:50

## 2018-12-15 RX ADMIN — PIPERACILLIN SODIUM,TAZOBACTAM SODIUM 4.5 G: 4; .5 INJECTION, POWDER, FOR SOLUTION INTRAVENOUS at 05:01

## 2018-12-15 RX ADMIN — ACETAMINOPHEN 1000 MG: 500 TABLET, FILM COATED ORAL at 09:50

## 2018-12-15 RX ADMIN — Medication 1 AMPULE: at 21:42

## 2018-12-15 RX ADMIN — GABAPENTIN 300 MG: 300 CAPSULE ORAL at 10:00

## 2018-12-15 RX ADMIN — VANCOMYCIN HYDROCHLORIDE 125 MG: 1 INJECTION, POWDER, LYOPHILIZED, FOR SOLUTION INTRAVENOUS at 12:00

## 2018-12-15 RX ADMIN — DOCUSATE SODIUM 100 MG: 100 CAPSULE, LIQUID FILLED ORAL at 16:26

## 2018-12-15 RX ADMIN — GABAPENTIN 300 MG: 300 CAPSULE ORAL at 16:27

## 2018-12-15 RX ADMIN — LEVOTHYROXINE SODIUM 75 MCG: 75 TABLET ORAL at 05:01

## 2018-12-15 RX ADMIN — PIPERACILLIN SODIUM,TAZOBACTAM SODIUM 4.5 G: 4; .5 INJECTION, POWDER, FOR SOLUTION INTRAVENOUS at 16:26

## 2018-12-15 NOTE — PROGRESS NOTES
Hospitalist Progress Note     Admit Date:  12/10/2018 12:12 PM   Name:  Tish Halsted   Age:  80 y.o.  :  1930   MRN:  [id number]   PCP:  Unknown, Provider  Treatment Team: Attending Provider: Gumaro Cabello MD; Utilization Review: Myles Cushing, CHERRY; Care Manager: Shaina Boone RN    Subjective:       From H and P HPI:  Cecile Coulnga is an 81 yo F long-term resident at [facility] with history of debility, recent L humeral neck fracture on , hypothyroidism, who presents to the ED with fever.  She reports she started feeling poorly about 4 days ago and developed a dry cough.  Then, she developed nausea (few episodes of vomiting) and diarrhea the next day. Madeline Kwok has had some slight abdominal discomfort.  She reports mild shortness of breath now. Madeline Kwok denies any chest pain.    Today, she was noted to have a fever of 103 and coughing more with wheezing (according to paperwork from SNF).  She was brought to the ED via EMS and given zosyn en route. In ED: WBC 14.3, lactic acid 2.75-->1.28 with IVF bolus, UA with trace bacteria and leuks, tachy with heart rate 125 (sinus tach on EKG), influenza negative. CT abdomen/pelvis showed no significant intra-abdominal pathology, but did show bibasilar airspace disease.     Hospitalist service asked to admit for sepsis     18:  No new complaints. Alert to person and intermittent aware in hospital. Short term memory absent. Cooperative. No reports of severe diarrhea. Started vancomycin oral  for c.diff. diarrhea. On zosyn and vancomycin for bibasilar pneumonia hcap on admit. Diarrhea/c.diff present on admission. 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 She can not be more specific.     12/15/18:  Complains of epigastric and bilateral upper quad abd. Discomfort. No recent bm per pt. Prior history of constipation. She has acute c. Diff colitis and pseudomonas pos stool culture.   Possible bibasilar pneumonia on ct abd. Not seen on cxr. Objective:     Patient Vitals for the past 24 hrs:   Temp Pulse Resp BP SpO2   12/15/18 0724 97.8 °F (36.6 °C) 98 18 160/76 94 %   12/15/18 0556 100 °F (37.8 °C) 100 20 151/83 95 %   12/14/18 2306 98.8 °F (37.1 °C) 89 20 145/57 94 %   12/1934 98.6 °F (37 °C) 91 20 143/64 95 %   12/14/18 1450 98.1 °F (36.7 °C) 80 18 138/62 96 %   12/14/18 1130 97.8 °F (36.6 °C) 87 18 139/69 94 %   12/14/18 0827 98 °F (36.7 °C) 92 18 131/77 95 %     Oxygen Therapy  O2 Sat (%): 94 % (12/15/18 0724)  Pulse via Oximetry: 84 beats per minute (12/12/18 0741)  O2 Device: Nasal cannula (12/12/18 1138)  O2 Flow Rate (L/min): 2 l/min (12/12/18 1138)    Intake/Output Summary (Last 24 hours) at 12/15/2018 0814  Last data filed at 12/15/2018 0556  Gross per 24 hour   Intake 360 ml   Output 900 ml   Net -540 ml         Physical Examination:    Physical Exam   Constitutional: No distress. HENT:   Nose: Nose normal.   Mouth/Throat: No oropharyngeal exudate. Eyes: Left eye exhibits no discharge. No scleral icterus. Neck: No JVD present. No tracheal deviation present. Cardiovascular: Normal rate. Exam reveals no gallop. No murmur heard. Pulmonary/Chest: Effort normal. She has no wheezes. She has no rales. She exhibits no tenderness. Abdominal: Bowel sounds are normal. There is tenderness. There is no rebound and no guarding. Musculoskeletal: She exhibits no tenderness or deformity. Lymphadenopathy:     She has no cervical adenopathy. Neurological: She is alert. She has normal reflexes. Skin: Skin is warm. Data Review:  I have reviewed all labs, meds, telemetry events, and studies from the last 24 hours.     Recent Results (from the past 24 hour(s))   CBC WITH AUTOMATED DIFF    Collection Time: 12/15/18  5:34 AM   Result Value Ref Range    WBC 10.1 4.3 - 11.1 K/uL    RBC 3.62 (L) 4.05 - 5.2 M/uL    HGB 10.8 (L) 11.7 - 15.4 g/dL    HCT 37.9 35.8 - 46.3 %    .7 (H) 79.6 - 97.8 FL    MCH 29.8 26.1 - 32.9 PG    MCHC 28.5 (L) 31.4 - 35.0 g/dL    RDW 14.7 (H) 11.9 - 14.6 %    PLATELET 165 341 - 520 K/uL    MPV 10.1 9.4 - 12.3 FL    ABSOLUTE NRBC 0.00 0.0 - 0.2 K/uL    DF AUTOMATED      NEUTROPHILS 66 43 - 78 %    LYMPHOCYTES 19 13 - 44 %    MONOCYTES 6 4.0 - 12.0 %    EOSINOPHILS 3 0.5 - 7.8 %    BASOPHILS 1 0.0 - 2.0 %    IMMATURE GRANULOCYTES 4 0.0 - 5.0 %    ABS. NEUTROPHILS 6.7 1.7 - 8.2 K/UL    ABS. LYMPHOCYTES 1.9 0.5 - 4.6 K/UL    ABS. MONOCYTES 0.6 0.1 - 1.3 K/UL    ABS. EOSINOPHILS 0.3 0.0 - 0.8 K/UL    ABS. BASOPHILS 0.1 0.0 - 0.2 K/UL    ABS. IMM. GRANS. 0.4 0.0 - 0.5 K/UL   METABOLIC PANEL, BASIC    Collection Time: 12/15/18  5:34 AM   Result Value Ref Range    Sodium 145 136 - 145 mmol/L    Potassium 4.3 3.5 - 5.1 mmol/L    Chloride 108 (H) 98 - 107 mmol/L    CO2 29 21 - 32 mmol/L    Anion gap 8 7 - 16 mmol/L    Glucose 101 (H) 65 - 100 mg/dL    BUN 20 8 - 23 MG/DL    Creatinine 1.03 (H) 0.6 - 1.0 MG/DL    GFR est AA >60 >60 ml/min/1.73m2    GFR est non-AA 54 (L) >60 ml/min/1.73m2    Calcium 11.0 (H) 8.3 - 10.4 MG/DL        All Micro Results     Procedure Component Value Units Date/Time    CULTURE, STOOL [[id number]]  (Abnormal)  (Susceptibility) Collected:  12/11/18 1800    Order Status:  Completed Specimen:  Stool Updated:  12/15/18 0770     Special Requests: NO SPECIAL REQUESTS        Culture result:       HEAVY PSEUDOMONAS AERUGINOSA                  No Salmonella, Shigella, or Ecoli 0157 isolated.           CULTURE, BLOOD [[id number]] Collected:  12/10/18 1224    Order Status:  Completed Specimen:  Blood Updated:  12/14/18 0733     Special Requests: --        LEFT  Antecubital       Culture result: NO GROWTH 4 DAYS       CULTURE, BLOOD [[id number]] Collected:  12/10/18 1237    Order Status:  Completed Specimen:  Blood Updated:  12/14/18 0733     Special Requests: --        RIGHT  Antecubital       Culture result: NO GROWTH 4 DAYS       CULTURE, URINE [[id number]] Collected:  12/10/18 1328    Order Status:  Completed Specimen:  Cath Urine Updated:  12/13/18 0647     Special Requests: NO SPECIAL REQUESTS        Culture result: NO GROWTH 2 DAYS       C. DIFFICILE AG & TOXIN A/B [[id number]]  (Abnormal) Collected:  12/11/18 1800    Order Status:  Completed Specimen:  Stool Updated:  12/12/18 1229     [address] ANTIGEN       C. DIFFICILE GDH ANTIGEN-POSITIVE           C. difficile toxin       C. DIFFICILE TOXIN-NEGATIVE           PCR Reflex PCR-POSITIVE        Comment: RESULTS VERIFIED, PHONED TO AND READ BACK BY  Bhaskar Valentine RN @1100 ON 12/12/18 AK. INTERPRETATION       POSITIVE FOR TOXIGENIC C. DIFFICILE           Clinical Consideration       RESULT REFLECTS PRESENCE OF TOXIGENIC ORGANISM. IF PT IS SYMPTOMATIC (>3 UNFORMED STOOLS/24HOURS)THIS RESULT SUGGESTS INFECTION AND WARRANTS TREATMENT AS C. DIFFICILE INFECTION. CULTURE, RESPIRATORY/SPUTUM/BRONCH [place] [[id number]] Collected:  12/10/18 1800    Order Status:  Canceled Specimen:  Sputum     INFLUENZA A & B AG (RAPID TEST) [[id number]] Collected:  12/10/18 1329    Order Status:  Completed Specimen:  Nasopharyngeal from Nasal washing Updated:  12/10/18 1359     Influenza A Ag NEGATIVE         Comment: NEGATIVE FOR THE PRESENCE OF INFLUENZA A ANTIGEN  INFECTION DUE TO INFLUENZA A CANNOT BE RULED OUT. BECAUSE THE ANTIGEN PRESENT IN THE SAMPLE MAY BE BELOW  THE DETECTION LIMIT OF THE TEST. A NEGATIVE TEST IS PRESUMPTIVE AND IT IS RECOMMENDED THAT THESE RESULTS BE CONFIRMED BY VIRAL CULTURE OR AN FDA-CLEARED INFLUENZA A AND B MOLECULAR ASSAY. Influenza B Ag NEGATIVE         Comment: NEGATIVE FOR THE PRESENCE OF INFLUENZA B ANTIGEN  INFECTION DUE TO INFLUENZA B CANNOT BE RULED OUT. BECAUSE THE ANTIGEN PRESENT IN THE SAMPLE MAY BE BELOW  THE DETECTION LIMIT OF THE TEST.   A NEGATIVE TEST IS PRESUMPTIVE AND IT IS RECOMMENDED THAT THESE RESULTS BE CONFIRMED BY VIRAL CULTURE OR AN FDA-CLEARED INFLUENZA A AND B MOLECULAR ASSAY. Source NASOPHARYNGEAL             Current Meds:  Current Facility-Administered Medications   Medication Dose Route Frequency    vancomycin (VANCOCIN) 1250 mg in  ml infusion  1,250 mg IntraVENous Q18H    vancomycin 50 mg/mL oral solution (compounded) 125 mg  125 mg Oral Q6H    piperacillin-tazobactam (ZOSYN) 4.5 g in 0.9% sodium chloride (MBP/ADV) 100 mL  4.5 g IntraVENous Q8H    enoxaparin (LOVENOX) injection 40 mg  40 mg SubCUTAneous Q24H    sodium chloride (NS) flush 5-10 mL  5-10 mL IntraVENous PRN    acetaminophen (TYLENOL) tablet 1,000 mg  1,000 mg Oral TID    aspirin delayed-release tablet 81 mg  81 mg Oral DAILY    cyanocobalamin tablet 1,000 mcg  1,000 mcg Oral DAILY    fluticasone (FLONASE) 50 mcg/actuation nasal spray 1 Spray  1 Spray Both Nostrils QHS    gabapentin (NEURONTIN) capsule 300 mg  300 mg Oral TID    levothyroxine (SYNTHROID) tablet 75 mcg  75 mcg Oral ACB    loratadine (CLARITIN) tablet 10 mg  10 mg Oral DAILY    sodium chloride (NS) flush 5-10 mL  5-10 mL IntraVENous Q8H    ondansetron (ZOFRAN) injection 4 mg  4 mg IntraVENous Q6H PRN    albuterol (PROVENTIL VENTOLIN) nebulizer solution 2.5 mg  2.5 mg Nebulization Q6H PRN    alcohol 62% (NOZIN) nasal  1 Ampule  1 Ampule Topical Q12H       Diet:  DIET REGULAR    Other Studies (last 24 hours):  No results found.     Assessment and Plan:     Hospital Problems as of 12/15/2018 Date Reviewed: 9/7/2016          Codes Class Noted - Resolved POA    Pseudomonas infection ICD-10-CM: B96.5  ICD-9-CM: 041.7  12/14/2018 - Present Unknown        C. difficile colitis ICD-10-CM: A04.72  ICD-9-CM: 008.45  12/12/2018 - Present Yes        * (Principal) Sepsis ([facility] [address] ICD-10-CM: A41.9  ICD-9-CM: 038.9, 995.91  12/10/2018 - Present Yes        HCAP (healthcare-associated pneumonia) ICD-10-CM: J18.9  ICD-9-CM: 324  12/10/2018 - Present Yes        Non-intractable vomiting with nausea ICD-10-CM: R11.2  ICD-9-CM: 787.01  12/10/2018 - Present Yes        Diarrhea of presumed infectious origin ICD-10-CM: R19.7  ICD-9-CM: 009.3  12/10/2018 - Present Yes        CKD (chronic kidney disease) stage 3, GFR 30-59 ml/min ([facility]) ICD-10-CM: N18.3  ICD-9-CM: 585.3  2/19/2017 - Present Yes              A/P:      · Sepsis from suspected HCAP (due to residing in SNF)-   Bibasilar infiltrates suggested on ct abdomen-- however primary source infectious process may be   Intraabdominal - c.diff colitis and possible infectious diarrhea now with stool culture positive for pseudomonas   continue zosyn and vanc iv--this is unchanged. Wbc has decreased stool freq. and volume apparently decreasing     · Clostridium diffic. Colitis-- 10 day oral vancomycin rx. ·    § Nausea/vomiting/diarrhea--resolved.  Now complaint of constipation  § Try stool softener     · Hypothyroidism- home med continued  · Mild hypercalcemia-- follow     · Debility- very sedentary   · Dementia       bed hold at [facility].            Code Status: DNR    Surrogate decision maker- Trista Vieira (daughter)-

## 2018-12-16 LAB
ANION GAP SERPL CALC-SCNC: 4 MMOL/L (ref 7–16)
BUN SERPL-MCNC: 19 MG/DL (ref 8–23)
CALCIUM SERPL-MCNC: 10.5 MG/DL (ref 8.3–10.4)
CHLORIDE SERPL-SCNC: 107 MMOL/L (ref 98–107)
CO2 SERPL-SCNC: 33 MMOL/L (ref 21–32)
CREAT SERPL-MCNC: 1.09 MG/DL (ref 0.6–1)
DIFFERENTIAL METHOD BLD: ABNORMAL
EOSINOPHIL # BLD: 0.3 K/UL (ref 0–0.8)
EOSINOPHIL NFR BLD MANUAL: 4 % (ref 1–8)
ERYTHROCYTE [DISTWIDTH] IN BLOOD BY AUTOMATED COUNT: 14.6 % (ref 11.9–14.6)
GLUCOSE SERPL-MCNC: 95 MG/DL (ref 65–100)
HCT VFR BLD AUTO: 36.8 % (ref 35.8–46.3)
HGB BLD-MCNC: 10.5 G/DL (ref 11.7–15.4)
LYMPHOCYTES # BLD: 3.1 K/UL (ref 0.5–4.6)
LYMPHOCYTES NFR BLD MANUAL: 37 % (ref 16–44)
MCH RBC QN AUTO: 29.2 PG (ref 26.1–32.9)
MCHC RBC AUTO-ENTMCNC: 28.5 G/DL (ref 31.4–35)
MCV RBC AUTO: 102.5 FL (ref 79.6–97.8)
METAMYELOCYTES NFR BLD MANUAL: 1 %
MONOCYTES # BLD: 0.6 K/UL (ref 0.1–1.3)
MONOCYTES NFR BLD MANUAL: 7 % (ref 3–9)
MYELOCYTES NFR BLD MANUAL: 1 %
NEUTS BAND NFR BLD MANUAL: 3 % (ref 0–10)
NEUTS SEG # BLD: 4.5 K/UL (ref 1.7–8.2)
NEUTS SEG NFR BLD MANUAL: 47 % (ref 47–75)
NRBC # BLD: 0 K/UL (ref 0–0.2)
PLATELET # BLD AUTO: 194 K/UL (ref 150–450)
PLATELET COMMENTS,PCOM: ADEQUATE
PMV BLD AUTO: 10.1 FL (ref 9.4–12.3)
POTASSIUM SERPL-SCNC: 3.6 MMOL/L (ref 3.5–5.1)
RBC # BLD AUTO: 3.59 M/UL (ref 4.05–5.2)
RBC MORPH BLD: ABNORMAL
SODIUM SERPL-SCNC: 144 MMOL/L (ref 136–145)
WBC # BLD AUTO: 8.5 K/UL (ref 4.3–11.1)

## 2018-12-16 PROCEDURE — 74011250637 HC RX REV CODE- 250/637: Performed by: INTERNAL MEDICINE

## 2018-12-16 PROCEDURE — 74011250636 HC RX REV CODE- 250/636: Performed by: INTERNAL MEDICINE

## 2018-12-16 PROCEDURE — 65270000029 HC RM PRIVATE

## 2018-12-16 PROCEDURE — 74011000258 HC RX REV CODE- 258: Performed by: INTERNAL MEDICINE

## 2018-12-16 PROCEDURE — 85025 COMPLETE CBC W/AUTO DIFF WBC: CPT

## 2018-12-16 PROCEDURE — 36415 COLL VENOUS BLD VENIPUNCTURE: CPT

## 2018-12-16 PROCEDURE — 80048 BASIC METABOLIC PNL TOTAL CA: CPT

## 2018-12-16 RX ADMIN — DOCUSATE SODIUM 100 MG: 100 CAPSULE, LIQUID FILLED ORAL at 08:35

## 2018-12-16 RX ADMIN — GABAPENTIN 300 MG: 300 CAPSULE ORAL at 08:36

## 2018-12-16 RX ADMIN — PIPERACILLIN SODIUM,TAZOBACTAM SODIUM 4.5 G: 4; .5 INJECTION, POWDER, FOR SOLUTION INTRAVENOUS at 14:55

## 2018-12-16 RX ADMIN — Medication 10 ML: at 05:28

## 2018-12-16 RX ADMIN — PANTOPRAZOLE SODIUM 40 MG: 40 TABLET, DELAYED RELEASE ORAL at 05:27

## 2018-12-16 RX ADMIN — ACETAMINOPHEN 1000 MG: 500 TABLET, FILM COATED ORAL at 17:34

## 2018-12-16 RX ADMIN — DOCUSATE SODIUM 100 MG: 100 CAPSULE, LIQUID FILLED ORAL at 17:34

## 2018-12-16 RX ADMIN — VANCOMYCIN HYDROCHLORIDE 125 MG: 1 INJECTION, POWDER, LYOPHILIZED, FOR SOLUTION INTRAVENOUS at 18:38

## 2018-12-16 RX ADMIN — FLUTICASONE PROPIONATE 1 SPRAY: 50 SPRAY, METERED NASAL at 22:00

## 2018-12-16 RX ADMIN — Medication 10 ML: at 23:27

## 2018-12-16 RX ADMIN — Medication 10 ML: at 14:55

## 2018-12-16 RX ADMIN — VANCOMYCIN HYDROCHLORIDE 125 MG: 1 INJECTION, POWDER, LYOPHILIZED, FOR SOLUTION INTRAVENOUS at 05:27

## 2018-12-16 RX ADMIN — LEVOTHYROXINE SODIUM 75 MCG: 75 TABLET ORAL at 05:27

## 2018-12-16 RX ADMIN — GABAPENTIN 300 MG: 300 CAPSULE ORAL at 23:26

## 2018-12-16 RX ADMIN — VANCOMYCIN HYDROCHLORIDE 1250 MG: 10 INJECTION, POWDER, LYOPHILIZED, FOR SOLUTION INTRAVENOUS at 02:12

## 2018-12-16 RX ADMIN — PIPERACILLIN SODIUM,TAZOBACTAM SODIUM 4.5 G: 4; .5 INJECTION, POWDER, FOR SOLUTION INTRAVENOUS at 05:31

## 2018-12-16 RX ADMIN — VANCOMYCIN HYDROCHLORIDE 125 MG: 1 INJECTION, POWDER, LYOPHILIZED, FOR SOLUTION INTRAVENOUS at 00:17

## 2018-12-16 RX ADMIN — VANCOMYCIN HYDROCHLORIDE 125 MG: 1 INJECTION, POWDER, LYOPHILIZED, FOR SOLUTION INTRAVENOUS at 14:55

## 2018-12-16 RX ADMIN — ACETAMINOPHEN 1000 MG: 500 TABLET, FILM COATED ORAL at 08:35

## 2018-12-16 RX ADMIN — ASPIRIN 81 MG: 81 TABLET, COATED ORAL at 09:00

## 2018-12-16 RX ADMIN — Medication 1 AMPULE: at 21:46

## 2018-12-16 RX ADMIN — ENOXAPARIN SODIUM 40 MG: 40 INJECTION, SOLUTION INTRAVENOUS; SUBCUTANEOUS at 21:46

## 2018-12-16 RX ADMIN — CYANOCOBALAMIN TAB 1000 MCG 1000 MCG: 1000 TAB at 08:35

## 2018-12-16 RX ADMIN — Medication 1 AMPULE: at 08:36

## 2018-12-16 RX ADMIN — ACETAMINOPHEN 1000 MG: 500 TABLET, FILM COATED ORAL at 23:26

## 2018-12-16 RX ADMIN — PIPERACILLIN SODIUM,TAZOBACTAM SODIUM 4.5 G: 4; .5 INJECTION, POWDER, FOR SOLUTION INTRAVENOUS at 21:46

## 2018-12-16 RX ADMIN — LORATADINE 10 MG: 10 TABLET ORAL at 08:35

## 2018-12-16 NOTE — PROGRESS NOTES
Hourly rounding completed. Patient is resting in bed, bed is locked and in lowest position, call light is within reach. Patient has been NPO since midnight. Will continue to monitor and give report to day shift RN.

## 2018-12-17 VITALS
BODY MASS INDEX: 33.62 KG/M2 | SYSTOLIC BLOOD PRESSURE: 151 MMHG | TEMPERATURE: 98.2 F | OXYGEN SATURATION: 94 % | DIASTOLIC BLOOD PRESSURE: 66 MMHG | RESPIRATION RATE: 18 BRPM | WEIGHT: 182.7 LBS | HEIGHT: 62 IN | HEART RATE: 79 BPM

## 2018-12-17 PROCEDURE — 74011250637 HC RX REV CODE- 250/637: Performed by: INTERNAL MEDICINE

## 2018-12-17 PROCEDURE — 74011250636 HC RX REV CODE- 250/636: Performed by: INTERNAL MEDICINE

## 2018-12-17 PROCEDURE — 97530 THERAPEUTIC ACTIVITIES: CPT

## 2018-12-17 PROCEDURE — 74011000258 HC RX REV CODE- 258: Performed by: INTERNAL MEDICINE

## 2018-12-17 RX ADMIN — PIPERACILLIN SODIUM,TAZOBACTAM SODIUM 4.5 G: 4; .5 INJECTION, POWDER, FOR SOLUTION INTRAVENOUS at 14:19

## 2018-12-17 RX ADMIN — Medication 10 ML: at 05:39

## 2018-12-17 RX ADMIN — ASPIRIN 81 MG: 81 TABLET, COATED ORAL at 08:55

## 2018-12-17 RX ADMIN — PIPERACILLIN SODIUM,TAZOBACTAM SODIUM 4.5 G: 4; .5 INJECTION, POWDER, FOR SOLUTION INTRAVENOUS at 05:39

## 2018-12-17 RX ADMIN — VANCOMYCIN HYDROCHLORIDE 125 MG: 1 INJECTION, POWDER, LYOPHILIZED, FOR SOLUTION INTRAVENOUS at 05:46

## 2018-12-17 RX ADMIN — VANCOMYCIN HYDROCHLORIDE 125 MG: 1 INJECTION, POWDER, LYOPHILIZED, FOR SOLUTION INTRAVENOUS at 11:33

## 2018-12-17 RX ADMIN — Medication 10 ML: at 14:21

## 2018-12-17 RX ADMIN — LEVOTHYROXINE SODIUM 75 MCG: 75 TABLET ORAL at 05:38

## 2018-12-17 RX ADMIN — CYANOCOBALAMIN TAB 1000 MCG 1000 MCG: 1000 TAB at 08:55

## 2018-12-17 RX ADMIN — PANTOPRAZOLE SODIUM 40 MG: 40 TABLET, DELAYED RELEASE ORAL at 06:00

## 2018-12-17 RX ADMIN — ACETAMINOPHEN 1000 MG: 500 TABLET, FILM COATED ORAL at 08:55

## 2018-12-17 RX ADMIN — LORATADINE 10 MG: 10 TABLET ORAL at 08:56

## 2018-12-17 RX ADMIN — VANCOMYCIN HYDROCHLORIDE 125 MG: 1 INJECTION, POWDER, LYOPHILIZED, FOR SOLUTION INTRAVENOUS at 00:43

## 2018-12-17 RX ADMIN — DOCUSATE SODIUM 100 MG: 100 CAPSULE, LIQUID FILLED ORAL at 08:56

## 2018-12-17 RX ADMIN — GABAPENTIN 300 MG: 300 CAPSULE ORAL at 08:56

## 2018-12-17 RX ADMIN — Medication 1 AMPULE: at 08:56

## 2018-12-17 NOTE — PROGRESS NOTES
Hourly rounding completed, patient slept through the night with no complaints. Patient is resting in bed, bed is locked and in lowest position, call light is within reach.  Will continue to monitor and give report to day shift RN

## 2018-12-17 NOTE — PROGRESS NOTES
Report called to Kamron Collazo LPN at [facility]. IVs removed.  Pickup time 5pm.

## 2018-12-17 NOTE — PROGRESS NOTES
Interdisciplinary Rounds completed 12/17/18. Nursing, Case Management, Physician and PT present. Plan of care reviewed and updated. Pt having formed stool now.   Back to [facility] in am.

## 2018-12-17 NOTE — DISCHARGE SUMMARY
Hospitalist Discharge Summary     Admit Date:  12/10/2018 12:12 PM   Name:  Arelis Galvan   Age:  80 y.o.  :  1930   MRN:  [id number]   PCP:  Unknown, Provider  Treatment Team: Attending Provider: Isrrael Vasquez MD; Utilization Review: Kimberli Upton RN; Care Manager: Virginia Mesa RN; Charge Nurse: Antionette Koch    Problem List for this Hospitalization:  Hospital Problems as of 2018 Date Reviewed: 2016          Codes Class Noted - Resolved POA    Pseudomonas infection ICD-10-CM: B96.5  ICD-9-CM: 041.7  2018 - Present Unknown        C. difficile colitis ICD-10-CM: A04.72  ICD-9-CM: 008.45  2018 - Present Yes        * (Principal) Sepsis ([facility] ICD-10-CM: A41.9  ICD-9-CM: 038.9, 995.91  12/10/2018 - Present Yes        HCAP (healthcare-associated pneumonia) ICD-10-CM: J18.9  ICD-9-CM: 486  12/10/2018 - Present Yes        Non-intractable vomiting with nausea ICD-10-CM: R11.2  ICD-9-CM: 787.01  12/10/2018 - Present Yes        Diarrhea of presumed infectious origin ICD-10-CM: R19.7  ICD-9-CM: 009.3  12/10/2018 - Present Yes        CKD (chronic kidney disease) stage 3, GFR 30-59 ml/min ([facility] ICD-10-CM: N18.3  ICD-9-CM: 585.3  2017 - Present Yes                Admission HPI from 12/10/2018:    \"From H and P HPI:  Benjamin Brownlee is an 81 yo F long-term resident at [facility] with history of debility, recent L humeral neck fracture on , hypothyroidism, who presents to the ED with fever.  She reports she started feeling poorly about 4 days ago and developed a dry cough.  Then, she developed nausea (few episodes of vomiting) and diarrhea the next day.  She has had some slight abdominal discomfort.  She reports mild shortness of breath now. Ayse Primas denies any chest pain.    Today, she was noted to have a fever of 103 and coughing more with wheezing (according to paperwork from SNF).  She was brought to the ED via EMS and given zosyn en route.   In ED: WBC 14.3, lactic acid 2.75-->1.28 with IVF bolus, UA with trace bacteria and leuks, tachy with heart rate 125 (sinus tach on EKG), influenza negative. CT abdomen/pelvis showed no significant intra-abdominal pathology, but did show bibasilar airspace disease.     Hospitalist service asked to admit for sepsis      Hospital Course:  Treated for bibasilar hcap with vanc and zosyn iv. Diarrhea noted and hx of chronic constipation. Patient stool positive for c.diff colitis and also grew pseudomonas. Finished course of zosyn today. Will finish 10 day oral vancomycin on Saturday. Ate breakfast , calm sedate at times but arousable. Follow up instructions and discharge meds at bottom of this note. Plan was discussed with case manger. All questions answered. Patient was stable at time of discharge. Diagnostic Imaging/Tests:   Ct Abd Pelv W Cont    Result Date: 12/10/2018  CT abdomen and pelvis done with IV contrast using ER protocol December 10, 2018 Reference exam: None Indication: Abdomen pain Technique: Radiation dose reduction techniques were used for this study using one or more of the following: automated exposure control; adjustment of mA and/or kV (according to patient size);  iterative reconstruction. 5 mm axial images were taken through the abdomen and pelvis using IV contrast using ER protocol. 100 cc Isovue 370 IV contrast was administered to better evaluate the abdominal and pelvic contents. Abdomen: Patient's hands are seen across the abdomen and decreasing sensitivity with artifact. Respiratory motion also decreases sensitivity but patchy opacities are seen in the lung bases that may be infiltrates with small effusions present. The liver, spleen, adrenals appear normal, the pancreas shows fatty replacement, gallbladder appears to be absent. Kidneys appear normal. There is no intraperitoneal free air, ascites, nor abnormal adenopathy seen. Pelvis: Patient has a high riding cecum, the appendix is not seen.  There are multiple diverticula but no evidence of diverticulitis seen, the uterus is present, the bladder and pelvic sidewalls appear normal. What appears to be a mesh is seen anteriorly. IMPRESSION: Possible infectious process in the lung bases. Decrease sensitivity as described above. There are diverticula along the colon but no evidence of active diverticulitis seen. Xr Chest Port    Result Date: 12/10/2018  Single portable upright chest x-ray December 10, 2018 Reference exam: September 28, 2018 INDICATION: Jose David Huffman in October, left shoulder pain FINDINGS: Lungs are underexpanded with patient rotated to the left, cardiac silhouette is still considered somewhat enlarged but pulmonary vascularity appears normal. Old fracture of the left shoulder is again seen. IMPRESSION: Lungs underinflated but cardiac silhouette still felt to be enlarged. If a bony injury is suspected dedicated imaging should be considered. Echocardiogram results:  No results found for this visit on 12/10/18. All Micro Results     Procedure Component Value Units Date/Time    CULTURE, BLOOD [[id number]] Collected:  12/10/18 1224    Order Status:  Completed Specimen:  Blood Updated:  12/15/18 1008     Special Requests: --        LEFT  Antecubital       Culture result: NO GROWTH 5 DAYS       CULTURE, BLOOD [[id number]] Collected:  12/10/18 1237    Order Status:  Completed Specimen:  Blood Updated:  12/15/18 1008     Special Requests: --        RIGHT  Antecubital       Culture result: NO GROWTH 5 DAYS       CULTURE, STOOL [[id number]]  (Abnormal)  (Susceptibility) Collected:  12/11/18 1800    Order Status:  Completed Specimen:  Stool Updated:  12/15/18 0742     Special Requests: NO SPECIAL REQUESTS        Culture result:       HEAVY PSEUDOMONAS AERUGINOSA                  No Salmonella, Shigella, or Ecoli 0157 isolated.           CULTURE, URINE [[id number]] Collected:  12/10/18 1328    Order Status:  Completed Specimen:  Cath Urine Updated: 12/13/18 0647     Special Requests: NO SPECIAL REQUESTS        Culture result: NO GROWTH 2 DAYS       C. DIFFICILE AG & TOXIN A/B [[id number]]  (Abnormal) Collected:  12/11/18 1800    Order Status:  Completed Specimen:  Stool Updated:  12/12/18 1229     [address] ANTIGEN       C. DIFFICILE GDH ANTIGEN-POSITIVE           C. difficile toxin       C. DIFFICILE TOXIN-NEGATIVE           PCR Reflex PCR-POSITIVE        Comment: RESULTS VERIFIED, PHONED TO AND READ BACK BY  Angelia Kay RN @4774 ON 12/12/18 AK. INTERPRETATION       POSITIVE FOR TOXIGENIC C. DIFFICILE           Clinical Consideration       RESULT REFLECTS PRESENCE OF TOXIGENIC ORGANISM. IF PT IS SYMPTOMATIC (>3 UNFORMED STOOLS/24HOURS)THIS RESULT SUGGESTS INFECTION AND WARRANTS TREATMENT AS C. DIFFICILE INFECTION. CULTURE, RESPIRATORY/SPUTUM/BRONCH Ritchie Hang [[id number]] Collected:  12/10/18 1800    Order Status:  Canceled Specimen:  Sputum     INFLUENZA A & B AG (RAPID TEST) [[id number]] Collected:  12/10/18 1329    Order Status:  Completed Specimen:  Nasopharyngeal from Nasal washing Updated:  12/10/18 1359     Influenza A Ag NEGATIVE         Comment: NEGATIVE FOR THE PRESENCE OF INFLUENZA A ANTIGEN  INFECTION DUE TO INFLUENZA A CANNOT BE RULED OUT. BECAUSE THE ANTIGEN PRESENT IN THE SAMPLE MAY BE BELOW  THE DETECTION LIMIT OF THE TEST. A NEGATIVE TEST IS PRESUMPTIVE AND IT IS RECOMMENDED THAT THESE RESULTS BE CONFIRMED BY VIRAL CULTURE OR AN FDA-CLEARED INFLUENZA A AND B MOLECULAR ASSAY. Influenza B Ag NEGATIVE         Comment: NEGATIVE FOR THE PRESENCE OF INFLUENZA B ANTIGEN  INFECTION DUE TO INFLUENZA B CANNOT BE RULED OUT. BECAUSE THE ANTIGEN PRESENT IN THE SAMPLE MAY BE BELOW  THE DETECTION LIMIT OF THE TEST. A NEGATIVE TEST IS PRESUMPTIVE AND IT IS RECOMMENDED THAT THESE RESULTS BE CONFIRMED BY VIRAL CULTURE OR AN FDA-CLEARED INFLUENZA A AND B MOLECULAR ASSAY.           Source NASOPHARYNGEAL             Labs: Results: BMP, Mg, Phos Recent Labs     12/16/18  0515 12/15/18  0534    145   K 3.6 4.3    108*   CO2 33* 29   AGAP 4* 8   BUN 19 20   CREA 1.09* 1.03*   CA 10.5* 11.0*   GLU 95 101*      CBC Recent Labs     12/16/18  0515 12/15/18  0534   WBC 8.5 10.1   RBC 3.59* 3.62*   HGB 10.5* 10.8*   HCT 36.8 37.9    191   GRANS 47 66   LYMPH 37 19   EOS 4 3   MONOS 7 6   BASOS  --  1   IG  --  4   ANEU 4.5 6.7   ABL 3.1 1.9   NOE 0.3 0.3   ABM 0.6 0.6   ABB  --  0.1   AIG  --  0.4      LFT No results for input(s): SGOT, ALT, TBIL, AP, TP, ALB, GLOB, AGRAT, GPT in the last 72 hours.    Cardiac Testing Lab Results   Component Value Date/Time     (H) 02/21/2017 06:13 AM     (H) 02/20/2017 06:50 AM     (H) 02/19/2017 10:41 AM    CK - MB 5.1 (H) 02/19/2017 10:41 AM    CK - MB 1.1 11/18/2016 04:35 PM    CK - MB 1.2 11/18/2016 12:05 PM    CK-MB Index 0.8 02/19/2017 10:41 AM    CK-MB Index 1.5 11/18/2016 04:35 PM    CK-MB Index 1.6 11/18/2016 12:05 PM    Troponin-I, Qt. <0.02 (L) 11/18/2016 04:35 PM    Troponin-I, Qt. <0.02 (L) 11/18/2016 12:05 PM    Troponin-I, Qt. <0.02 (L) 11/17/2016 08:35 PM      Coagulation Tests No results found for: PTP, INR, APTT   A1c No results found for: HBA1C, HGBE8, QZQ5NDGG   Lipid Panel Lab Results   Component Value Date/Time    Cholesterol, total 208 (H) 03/12/2014 12:09 PM    HDL Cholesterol 45 03/12/2014 12:09 PM    LDL, calculated 137 (H) 03/12/2014 12:09 PM    VLDL, calculated 26 03/12/2014 12:09 PM    Triglyceride 130 03/12/2014 12:09 PM    CHOL/HDL Ratio 4.6 03/12/2014 12:09 PM      Thyroid Panel No results found for: TSH, T4, FT4, TT3, T3U, TSHEXT     Most Recent UA Lab Results   Component Value Date/Time    Color YELLOW 12/10/2018 01:28 PM    Appearance CLOUDY 12/10/2018 01:28 PM    Specific gravity 1.017 12/10/2018 01:28 PM    pH (UA) 6.0 12/10/2018 01:28 PM    Protein 100 (A) 12/10/2018 01:28 PM    Glucose NEGATIVE  12/10/2018 01:28 PM    Ketone NEGATIVE 12/10/2018 01:28 PM    Bilirubin NEGATIVE  12/10/2018 01:28 PM    Blood LARGE (A) 12/10/2018 01:28 PM    Urobilinogen 0.2 12/10/2018 01:28 PM    Nitrites POSITIVE (A) 12/10/2018 01:28 PM    Leukocyte Esterase NEGATIVE  12/10/2018 01:28 PM        No Known Allergies  Immunization History   Administered Date(s) Administered    Influenza Vaccine 12/09/2013, 10/27/2014    Influenza Vaccine (Quad) PF 11/20/2016, 10/04/2017    Influenza Vaccine Split 12/10/2012    Pneumococcal Conjugate (PCV-13) 05/25/2016    Pneumococcal Polysaccharide (PPSV-23) 12/09/2001    Pneumococcal Vaccine (Unspecified Type) 10/04/2017    TB Skin Test (PPD) Intradermal 11/18/2016, 02/19/2017       All Labs from Last 24 Hrs:  No results found for this or any previous visit (from the past 24 hour(s)). Discharge Exam:  Patient Vitals for the past 24 hrs:   Temp Pulse Resp BP SpO2   12/17/18 1424 98.2 °F (36.8 °C) 79 18 151/66 94 %   12/17/18 1113 98.4 °F (36.9 °C) 74 18 133/59 96 %   12/17/18 0759 97.6 °F (36.4 °C) 79 18 131/63 95 %   12/17/18 0531 97.9 °F (36.6 °C) 78 18 157/67 93 %   12/16/18 2311 98.5 °F (36.9 °C) 87 18 128/57 94 %   12/16/18 2133 98.6 °F (37 °C) 88 18 137/57 94 %   12/16/18 1459 99.4 °F (37.4 °C) 89 16 146/72 95 %     Oxygen Therapy  O2 Sat (%): 94 % (12/17/18 1424)  Pulse via Oximetry: 72 beats per minute (12/15/18 2353)  O2 Device: Nasal cannula (12/17/18 1424)  O2 Flow Rate (L/min): 2 l/min (12/17/18 1424)    Intake/Output Summary (Last 24 hours) at 12/17/2018 1450  Last data filed at 12/17/2018 0532  Gross per 24 hour   Intake    Output 250 ml   Net -250 ml         Physical exam:  General:    Well nourished. Alert. No distress. Eyes:   Normal sclera. Extraocular movements intact. ENT:  Normocephalic, atraumatic. Moist mucous membranes  CV:   Regular rate and rhythm. No murmur, rub, or gallop. Lungs:  Clear to auscultation bilaterally. No wheezing, rhonchi, or rales.   Abdomen: Soft, nontender, nondistended. Bowel sounds normal.   Extremities: Warm and dry. No cyanosis or edema. Neurologic: No focal deficits  Skin:     No rashes or jaundice. Psych:  dementia    Discharge Info:   Current Discharge Medication List      START taking these medications    Details   vancomycin 50 mg/mL oral solution (compounded) Take 2.5 mL by mouth every six (6) hours. Qty: 50 mL, Refills: 0         CONTINUE these medications which have NOT CHANGED    Details   gabapentin (NEURONTIN) 300 mg capsule Take 300 mg by mouth three (3) times daily. aspirin delayed-release 81 mg tablet Take 81 mg by mouth daily. cyanocobalamin (VITAMIN B-12) 1,000 mcg tablet Take 1,000 mcg by mouth daily. fluticasone (FLONASE ALLERGY RELIEF) 50 mcg/actuation nasal spray 1 [place] by Both Nostrils route nightly. loratadine (CLARITIN) 10 mg tablet Take 10 mg by mouth daily. senna (SENNA) 8.6 mg tablet Take 2 Tabs by mouth daily. docusate sodium (COLACE) 100 mg capsule Take 100 mg by mouth two (2) times a day. levothyroxine (SYNTHROID) 75 mcg tablet Take 1 Tab by mouth Daily (before breakfast). Qty: 30 Tab, Refills: 5      acetaminophen (TYLENOL) 500 mg tablet Take 1,000 mg by mouth three (3) times daily. ondansetron (ZOFRAN ODT) 8 mg disintegrating tablet Take 1 Tab by mouth every eight (8) hours as needed for Nausea.   Qty: 20 Tab, Refills: 0         STOP taking these medications       gabapentin (NEURONTIN) 600 mg tablet Comments:   Reason for Stopping:                 Disposition: SNF    Activity: PT/OT Eval and Treat  Diet: DIET REGULAR    Follow-up Appointments   Procedures    FOLLOW UP VISIT Appointment in: 3 - 5 Days Follow up house provider as soon as possible     Follow up house provider as soon as possible     Standing Status:   Standing     Number of Occurrences:   1     Order Specific Question:   Appointment in     Answer:   3 - 5 Days         Follow-up Information     Follow up With Specialties Details Why Contact Info    Britt [address] [facility] Buddy Edwards [facility] [address] [facility]  [phone]      Unknown, Provider    Patient not available to ask                Time spent in patient discharge planning and coordination 42 minutes.

## 2018-12-17 NOTE — PROGRESS NOTES
Problem: Mobility Impaired (Adult and Pediatric)  Goal: *Acute Goals and Plan of Care (Insert Text)  LTG:  (1.)Ms. Vieira will move from supine to sit and sit to supine , scoot up and down and roll side to side with MODERATE ASSIST within 7 treatment day(s). (2.)Ms. Vieira will transfer from bed to chair and chair to bed with MAXIMAL ASSIST using the least restrictive device within 7 treatment day(s). (3.)Ms. Vieira will ambulate with MAXIMAL ASSIST for 5 feet with the least restrictive device within 7 treatment day(s). ________________________________________________________________________________________________    PHYSICAL THERAPY: Daily Note, Treatment Day: 2nd, PM 12/17/2018  INPATIENT: Hospital Day: 8  [place] / Product Type: Medicare /      NAME/AGE/GENDER: Melia Vieira is a 80 y.o. female   PRIMARY DIAGNOSIS: Sepsis ([facility] Sepsis ([facility] Sepsis ([facility]       ICD-10: Treatment Diagnosis:    · Generalized Muscle Weakness (M62.81)  · Difficulty in walking, Not elsewhere classified (R26.2)  · Other abnormalities of gait and mobility (R26.89)   Precaution/Allergies:  Patient has no known allergies. ASSESSMENT:     Ms. Meseret Galicia was supine upon contact and agreeable to PT. Patient able to perform supine to sit with max assist, additional time, and cues for improved technique. Patient sits EOB x 10 minutes demonstrating fair sitting balance occasionally propping on elbow due to fatigue. Unsafe to attempt transfer to standing with assist x 1. Patient fatigues quickly asking to return to supine. Patient returns to supine with max assist where she was repositioned for comfort. Overall slow progress towards physical therapy goals. No goals have been met thus far. Will continue efforts. This section established at most recent assessment   PROBLEM LIST (Impairments causing functional limitations):  1. Decreased Strength  2.  Decreased ADL/Functional Activities  3. Decreased Transfer Abilities  4. Decreased Ambulation Ability/Technique  5. Decreased Balance  6. Decreased Activity Tolerance  7. Decreased Cognition   INTERVENTIONS PLANNED: (Benefits and precautions of physical therapy have been discussed with the patient.)  1. Balance Exercise  2. Bed Mobility  3. Gait Training  4. Home Exercise Program (HEP)  5. Therapeutic Activites  6. Therapeutic Exercise/Strengthening  7. Transfer Training     TREATMENT PLAN: Frequency/Duration: 2-3 times a week for duration of hospital stay  Rehabilitation Potential For Stated Goals: Good     RECOMMENDED REHABILITATION/EQUIPMENT: (at time of discharge pending progress): Due to the probability of continued deficits (see above) this patient will likely need continued skilled physical therapy after discharge. Equipment:    tbd, likely none              HISTORY:   History of Present Injury/Illness (Reason for Referral):  Per H&P, \"Marta Vieira is an 81 yo F long-term resident at [facility] with history of debility, recent L humeral neck fracture on 9/28, hypothyroidism, who presents to the ED with fever. She reports she started feeling poorly about 4 days ago and developed a dry cough. Then, she developed nausea (few episodes of vomiting) and diarrhea the next day. She has had some slight abdominal discomfort. She reports mild shortness of breath now. She denies any chest pain. Today, she was noted to have a fever of 103 and coughing more with wheezing (according to paperwork from SNF). She was brought to the ED via EMS and given zosyn en route. In ED: WBC 14.3, lactic acid 2.75-->1.28 with IVF bolus, UA with trace bacteria and leuks, tachy with heart rate 125 (sinus tach on EKG), influenza negative. CT abdomen/pelvis showed no significant intra-abdominal pathology, but did show bibasilar airspace disease. \"    Past Medical History/Comorbidities:   Ms. Mitzi Acosta  has a past medical history of Arthritis, Headache(784.0), Hypertension, Ill-defined condition, Ill-defined condition, Psychiatric disorder, and Thyroid disease. Ms. Pushpa Uriarte  has a past surgical history that includes pr cardiac surg procedure unlist; hx cholecystectomy; and hx orthopaedic. Social History/Living Environment:   Home Environment: [address] [place] [address] Name: Hermilo sagastume  One/Two Story Residence: One story  Living Alone: No  Support Systems: Skilled nursing facility  Patient Expects to be Discharged to[de-identified] 2 [facility]  Current DME Used/Available at Home: Wheelchair  Tub or Shower Type: Shower  Prior Level of Function/Work/Activity:  Pt from long term care facility/SNF. She is unable to provide history due to cognition. She reports needing assist of 2 for transfer. Number of Personal Factors/Comorbidities that affect the Plan of Care: 1-2: MODERATE COMPLEXITY   EXAMINATION:   Most Recent Physical Functioning:   Gross Assessment:                  Posture:     Balance:  Sitting: Impaired  Sitting - Static: Prop sitting; Fair (occasional)  Sitting - Dynamic: Poor (constant support) Bed Mobility:  Rolling: Maximum assistance  Supine to Sit: Maximum assistance  Sit to Supine: Maximum assistance  Scooting: Total assistance  Wheelchair Mobility:     Transfers:     Gait:            Body Structures Involved:  1. Muscles Body Functions Affected:  1. Mental  2. Movement Related Activities and Participation Affected:  1. General Tasks and Demands  2. Mobility  3. Domestic Life  4. Community, Social and [place]   Number of elements that affect the Plan of Care: 4+: HIGH COMPLEXITY   CLINICAL PRESENTATION:   Presentation: Evolving clinical presentation with changing clinical characteristics: MODERATE COMPLEXITY   CLINICAL DECISION MAKIN [facility] Mobility Inpatient Short Form  How much difficulty does the patient currently have. .. Unable A Lot A Little None   1.   Turning over in bed (including adjusting bedclothes, sheets and blankets)? [] 1   [x] 2   [] 3   [] 4   2. Sitting down on and standing up from a chair with arms ( e.g., wheelchair, bedside commode, etc.)   [x] 1   [] 2   [] 3   [] 4   3. Moving from lying on back to sitting on the side of the bed? [] 1   [x] 2   [] 3   [] 4   How much help from another person does the patient currently need. .. Total A Lot A Little None   4. Moving to and from a bed to a chair (including a wheelchair)? [x] 1   [] 2   [] 3   [] 4   5. Need to walk in hospital room? [x] 1   [] 2   [] 3   [] 4   6. Climbing 3-5 steps with a railing? [x] 1   [] 2   [] 3   [] 4   © 2007, Trustees of [address] [place] [address], under license to [employer]. All rights reserved      Score:  Initial: 8 Most Recent: X (Date: -- )    Interpretation of Tool:  Represents activities that are increasingly more difficult (i.e. Bed mobility, Transfers, Gait). Score 24 23 22-20 19-15 14-10 9-7 6     Modifier CH CI CJ CK CL CM CN      ? Mobility - Walking and Moving Around:     - CURRENT STATUS: CM - 80%-99% impaired, limited or restricted    - GOAL STATUS: CL - 60%-79% impaired, limited or restricted    - D/C STATUS:  ---------------To be determined---------------  [place] / [place] / Product Type: Medicare /      Medical Necessity:     · Patient demonstrates fair rehab potential due to higher previous functional level. Reason for Services/Other Comments:  · Patient continues to demonstrate capacity to improve strength, mobility which will increase independence, decrease amount of assistance required from caregiver and increase safety.    Use of outcome tool(s) and clinical judgement create a POC that gives a: Questionable prediction of patient's progress: MODERATE COMPLEXITY            TREATMENT:   (In addition to Assessment/Re-Assessment sessions the following treatments were rendered)   Pre-treatment Symptoms/Complaints: None  Pain: Initial:   Pain Intensity 1: 0  Post Session:  0/10     Therapeutic Activity: (    15 Minutes): Therapeutic activities including bed mobility training, static/dynamic sitting balance on EOB, posture training, scooting, and patient education to improve mobility, strength and balance. Required mod verbal, tactile and manual cues   to promote static and dynamic balance in sitting and promote coordination of bilateral, lower extremity(s). SUPINE Date:   Date:   Date:     Activity/Exercise Seated Parameters Parameters Parameters   AP's X 10 B A/ AA     Heel slides X 5 AA     SAQ's X 5 AA     Hip Flex      Hip ABD                            Braces/Orthotics/Lines/Etc:   · IV  · O2 Device: Nasal cannula   Treatment/Session Assessment:    · Response to Treatment:  See above  · Interdisciplinary Collaboration:   o Physical Therapy Assistant  o Registered Nurse  · After treatment position/precautions:   o Supine in bed  o Bed/Chair-wheels locked  o Bed in low position  o Call light within reach  o RN notified   · Compliance with Program/Exercises: Will assess as treatment progresses  · Recommendations/Intent for next treatment session: \"Next visit will focus on advancements to more challenging activities and reduction in assistance provided\".   Total Treatment Duration:  PT Patient Time In/Time Out  Time In: 1512  Time Out: Vandana 74, PTA

## 2018-12-17 NOTE — PROGRESS NOTES
Patient to discharge to long term bed at [facility] rehab room 302D. Report line, [phone], given to [facility] Zain Garzon transportation setup for nubelo. Family made aware and in agreement.

## 2018-12-18 ENCOUNTER — PATIENT OUTREACH (OUTPATIENT)
Dept: CASE MANAGEMENT | Age: 83
End: 2018-12-18

## 2018-12-18 NOTE — PROGRESS NOTES
This note will not be viewable in [address] Patient DC to [facility]. Needs met by staff. No GEOFFREY indicated. episode closed at this time.

## 2018-12-28 ENCOUNTER — HOSPITAL ENCOUNTER (OUTPATIENT)
Dept: LAB | Age: 83
Discharge: HOME | End: 2018-12-28

## 2018-12-28 LAB
ALBUMIN SERPL-MCNC: 2.4 G/DL (ref 3.2–4.6)
ALBUMIN/GLOB SERPL: 0.5 {RATIO} (ref 1.2–3.5)
ALP SERPL-CCNC: 177 U/L (ref 50–136)
ALT SERPL-CCNC: 17 U/L (ref 12–65)
ANION GAP SERPL CALC-SCNC: 6 MMOL/L (ref 7–16)
AST SERPL-CCNC: 30 U/L (ref 15–37)
BASOPHILS # BLD: 0.1 K/UL (ref 0–0.2)
BASOPHILS NFR BLD: 1 % (ref 0–2)
BILIRUB SERPL-MCNC: 0.2 MG/DL (ref 0.2–1.1)
BUN SERPL-MCNC: 14 MG/DL (ref 8–23)
CALCIUM SERPL-MCNC: 9.7 MG/DL (ref 8.3–10.4)
CHLORIDE SERPL-SCNC: 104 MMOL/L (ref 98–107)
CO2 SERPL-SCNC: 29 MMOL/L (ref 21–32)
CREAT SERPL-MCNC: 1.04 MG/DL (ref 0.6–1)
DIFFERENTIAL METHOD BLD: ABNORMAL
EOSINOPHIL # BLD: 0.2 K/UL (ref 0–0.8)
EOSINOPHIL NFR BLD: 2 % (ref 0.5–7.8)
ERYTHROCYTE [DISTWIDTH] IN BLOOD BY AUTOMATED COUNT: 15.2 % (ref 11.9–14.6)
GLOBULIN SER CALC-MCNC: 5.3 G/DL (ref 2.3–3.5)
GLUCOSE SERPL-MCNC: 106 MG/DL (ref 65–100)
HCT VFR BLD AUTO: 34.8 % (ref 35.8–46.3)
HGB BLD-MCNC: 10 G/DL (ref 11.7–15.4)
IMM GRANULOCYTES # BLD: 0.1 K/UL (ref 0–0.5)
IMM GRANULOCYTES NFR BLD AUTO: 1 % (ref 0–5)
LYMPHOCYTES # BLD: 3.3 K/UL (ref 0.5–4.6)
LYMPHOCYTES NFR BLD: 32 % (ref 13–44)
MCH RBC QN AUTO: 29.5 PG (ref 26.1–32.9)
MCHC RBC AUTO-ENTMCNC: 28.7 G/DL (ref 31.4–35)
MCV RBC AUTO: 102.7 FL (ref 79.6–97.8)
MONOCYTES # BLD: 1 K/UL (ref 0.1–1.3)
MONOCYTES NFR BLD: 10 % (ref 4–12)
NEUTS SEG # BLD: 5.5 K/UL (ref 1.7–8.2)
NEUTS SEG NFR BLD: 54 % (ref 43–78)
NRBC # BLD: 0 K/UL (ref 0–0.2)
PLATELET # BLD AUTO: 242 K/UL (ref 150–450)
PMV BLD AUTO: 11.3 FL (ref 9.4–12.3)
POTASSIUM SERPL-SCNC: 4.1 MMOL/L (ref 3.5–5.1)
PROT SERPL-MCNC: 7.7 G/DL (ref 6.3–8.2)
RBC # BLD AUTO: 3.39 M/UL (ref 4.05–5.2)
SODIUM SERPL-SCNC: 139 MMOL/L (ref 136–145)
WBC # BLD AUTO: 10.1 K/UL (ref 4.3–11.1)

## 2018-12-28 PROCEDURE — 85025 COMPLETE CBC W/AUTO DIFF WBC: CPT

## 2018-12-28 PROCEDURE — 80053 COMPREHEN METABOLIC PANEL: CPT

## 2019-01-04 ENCOUNTER — HOSPITAL ENCOUNTER (OUTPATIENT)
Dept: LAB | Age: 84
Discharge: HOME | End: 2019-01-04

## 2019-01-04 LAB
ANION GAP SERPL CALC-SCNC: 6 MMOL/L (ref 7–16)
BASOPHILS # BLD: 0 K/UL (ref 0–0.2)
BASOPHILS NFR BLD: 1 % (ref 0–2)
BUN SERPL-MCNC: 20 MG/DL (ref 8–23)
CALCIUM SERPL-MCNC: 10.6 MG/DL (ref 8.3–10.4)
CHLORIDE SERPL-SCNC: 104 MMOL/L (ref 98–107)
CO2 SERPL-SCNC: 29 MMOL/L (ref 21–32)
CREAT SERPL-MCNC: 1.17 MG/DL (ref 0.6–1)
DIFFERENTIAL METHOD BLD: ABNORMAL
EOSINOPHIL # BLD: 0.4 K/UL (ref 0–0.8)
EOSINOPHIL NFR BLD: 6 % (ref 0.5–7.8)
ERYTHROCYTE [DISTWIDTH] IN BLOOD BY AUTOMATED COUNT: 15.6 % (ref 11.9–14.6)
GLUCOSE SERPL-MCNC: 122 MG/DL (ref 65–100)
HCT VFR BLD AUTO: 36.7 % (ref 35.8–46.3)
HGB BLD-MCNC: 10.6 G/DL (ref 11.7–15.4)
IMM GRANULOCYTES # BLD: 0 K/UL (ref 0–0.5)
IMM GRANULOCYTES NFR BLD AUTO: 1 % (ref 0–5)
LYMPHOCYTES # BLD: 2.2 K/UL (ref 0.5–4.6)
LYMPHOCYTES NFR BLD: 29 % (ref 13–44)
MCH RBC QN AUTO: 29.4 PG (ref 26.1–32.9)
MCHC RBC AUTO-ENTMCNC: 28.9 G/DL (ref 31.4–35)
MCV RBC AUTO: 101.7 FL (ref 79.6–97.8)
MONOCYTES # BLD: 0.5 K/UL (ref 0.1–1.3)
MONOCYTES NFR BLD: 7 % (ref 4–12)
NEUTS SEG # BLD: 4.3 K/UL (ref 1.7–8.2)
NEUTS SEG NFR BLD: 57 % (ref 43–78)
NRBC # BLD: 0 K/UL (ref 0–0.2)
PLATELET # BLD AUTO: 188 K/UL (ref 150–450)
PMV BLD AUTO: 11 FL (ref 9.4–12.3)
POTASSIUM SERPL-SCNC: 4.6 MMOL/L (ref 3.5–5.1)
RBC # BLD AUTO: 3.61 M/UL (ref 4.05–5.2)
SODIUM SERPL-SCNC: 139 MMOL/L (ref 136–145)
WBC # BLD AUTO: 7.6 K/UL (ref 4.3–11.1)

## 2019-01-04 PROCEDURE — 80048 BASIC METABOLIC PNL TOTAL CA: CPT

## 2019-01-04 PROCEDURE — 85025 COMPLETE CBC W/AUTO DIFF WBC: CPT
